# Patient Record
Sex: MALE | Race: WHITE | Employment: FULL TIME | URBAN - METROPOLITAN AREA
[De-identification: names, ages, dates, MRNs, and addresses within clinical notes are randomized per-mention and may not be internally consistent; named-entity substitution may affect disease eponyms.]

---

## 2017-11-06 ENCOUNTER — TELEPHONE (OUTPATIENT)
Dept: FAMILY MEDICINE CLINIC | Facility: CLINIC | Age: 47
End: 2017-11-06

## 2017-11-06 DIAGNOSIS — E55.9 VITAMIN D DEFICIENCY: ICD-10-CM

## 2017-11-06 DIAGNOSIS — Z00.00 LABORATORY EXAMINATION ORDERED AS PART OF A ROUTINE GENERAL MEDICAL EXAMINATION: ICD-10-CM

## 2017-11-06 DIAGNOSIS — E53.8 VITAMIN B12 DEFICIENCY: Primary | ICD-10-CM

## 2017-11-06 DIAGNOSIS — E04.1 THYROID NODULE: ICD-10-CM

## 2017-11-06 DIAGNOSIS — Z13.220 LIPID SCREENING: ICD-10-CM

## 2017-11-06 NOTE — TELEPHONE ENCOUNTER
Requesting Labs prior to appt   LOV: 12/12/16  RTC: 3 mo   Last Relevant Labs: B12, D, Testosterone, TSH/Free T4, CBC, PSA, LIPID and CMP 12/16/16      Future Appointments  Date Time Provider Balwinder Mariscal   12/4/2017 10:00 AM Sadie Haddad MD [facility] 2

## 2017-11-06 NOTE — TELEPHONE ENCOUNTER
Time started: 1135    Time ended: 1135    Total time spent on chart: 30 seconds.     LM for patient that labs were ordered and can be completed at [facility].

## 2017-11-06 NOTE — TELEPHONE ENCOUNTER
Dr. Yudi Ferreira- Pt would like to request a lab order to [facility] as he has an appt on Saturday with [facility].  Pt would like a call when order is placed.

## 2017-11-13 ENCOUNTER — TELEPHONE (OUTPATIENT)
Dept: FAMILY MEDICINE CLINIC | Facility: CLINIC | Age: 47
End: 2017-11-13

## 2017-11-13 DIAGNOSIS — Z12.5 PROSTATE CANCER SCREENING: Primary | ICD-10-CM

## 2017-11-13 NOTE — TELEPHONE ENCOUNTER
Time started: 0911    Time ended: 2804    Total time spent on chart: 7 min. I called patient to let him know he can go back and get [facility] at [facility].  He was told by lab that they will run the lab if we send the order to them.   I called [facility] to add l

## 2017-11-13 NOTE — TELEPHONE ENCOUNTER
Dr. Indira Bedolla- Patient stated that an order for PSA was left off of his labs. He would like this sent to the [address] [place] [address] lab. Please advise.

## 2017-12-04 ENCOUNTER — OFFICE VISIT (OUTPATIENT)
Dept: FAMILY MEDICINE CLINIC | Facility: CLINIC | Age: 47
End: 2017-12-04

## 2017-12-04 VITALS
WEIGHT: 204 LBS | DIASTOLIC BLOOD PRESSURE: 78 MMHG | HEART RATE: 64 BPM | BODY MASS INDEX: 27.04 KG/M2 | HEIGHT: 73 IN | RESPIRATION RATE: 16 BRPM | TEMPERATURE: 98 F | SYSTOLIC BLOOD PRESSURE: 134 MMHG

## 2017-12-04 DIAGNOSIS — Z00.00 WELLNESS EXAMINATION: Primary | ICD-10-CM

## 2017-12-04 PROCEDURE — 99396 PREV VISIT EST AGE 40-64: CPT | Performed by: FAMILY MEDICINE

## 2017-12-04 PROCEDURE — 90471 IMMUNIZATION ADMIN: CPT | Performed by: FAMILY MEDICINE

## 2017-12-04 PROCEDURE — 90686 IIV4 VACC NO PRSV 0.5 ML IM: CPT | Performed by: FAMILY MEDICINE

## 2017-12-04 NOTE — H&P
Wellness Exam    REASON FOR VISIT:    Neftali Naik is a 49year old male who presents for an [address]     Current Complaints: none  Flu Shot: see immunization record  Health Maintenance Topics with due status: Overdue       Topic Date D Body mass index is 26.91 kg/m².       Preventive Services for Which Recommendations Vary with Risk Recommendation Internal Lab or Procedure External Lab or Procedure   Cholesterol Screening Recommended screening varies with age, risk and gender LDL-CHOLESTE Smokeless tobacco: Never Used                      Alcohol use: Yes                  REVIEW OF SYSTEMS:   Constitutional: Negative for fever, chills and fatigue. HENT: Negative for hearing loss, congestion, sore throat and neck pain. He has normal reflexes. Skin: Skin is warm. No concerning rash noted. No erythema. with normal hair  Psychiatric: He has a normal mood and affect.  His behavior is normal.     ASSESSMENT AND OTHER RELEVANT CHRONIC CONDITIONS:   Steven Fagan is a 50 ye older: one dose   Varicella 2 doses if not immune   MMR 1-2 doses if born after 1957 and not immune     Patient Active Problem List:     Routine general medical examination at a health care facility     B12 deficiency     Dysplastic nevi     Thyroid nodule

## 2017-12-04 NOTE — PATIENT INSTRUCTIONS
Thank you for choosing Dawn Carpio MD at [place] [address]  To Do: [address]  1. Please see age appropriate health prevention below  Effective 6/19/17 until November 2017  Due to [employer] is being moved.   It is inside the [facility] • Please call our office about any questions regarding your treatment/medicines/tests as a result of today's visit.  For your safety, read the entire package insert of all medicines prescribed to you and be aware of all of the risks of treatment even beyon Screening tests and vaccines are an important part of managing your health. Health counseling is essential, too. Below are guidelines for these, for men ages 36 to 52. Talk with your healthcare provider to make sure you’re up to date on what you need.   Scr Hepatitis A Men at increased risk for infection – talk with your healthcare provider 2 doses given at least 6 months apart   Hepatitis B Men at increased risk for infection – talk with your healthcare provider 3 doses over 6 months; second dose should be g © 7842-3452 [address]. [address] [facility], [facility] [place]. All rights reserved. This information is not intended as a substitute for professional medical care. Always follow your healthcare professional's instructions.

## 2018-06-07 ENCOUNTER — HOSPITAL ENCOUNTER (EMERGENCY)
Age: 48
Discharge: HOME | End: 2018-06-07
Attending: EMERGENCY MEDICINE
Payer: COMMERCIAL

## 2018-06-07 VITALS
HEART RATE: 62 BPM | SYSTOLIC BLOOD PRESSURE: 130 MMHG | TEMPERATURE: 99 F | OXYGEN SATURATION: 98 % | DIASTOLIC BLOOD PRESSURE: 79 MMHG | HEIGHT: 72 IN | WEIGHT: 187 LBS | BODY MASS INDEX: 25.33 KG/M2 | RESPIRATION RATE: 18 BRPM

## 2018-06-07 DIAGNOSIS — R31.0 GROSS HEMATURIA: Primary | ICD-10-CM

## 2018-06-07 DIAGNOSIS — R30.0 DYSURIA: ICD-10-CM

## 2018-06-07 PROCEDURE — 81015 MICROSCOPIC EXAM OF URINE: CPT | Performed by: EMERGENCY MEDICINE

## 2018-06-07 PROCEDURE — 99283 EMERGENCY DEPT VISIT LOW MDM: CPT

## 2018-06-07 PROCEDURE — 81001 URINALYSIS AUTO W/SCOPE: CPT | Performed by: EMERGENCY MEDICINE

## 2018-06-07 RX ORDER — SULFAMETHOXAZOLE AND TRIMETHOPRIM 800; 160 MG/1; MG/1
1 TABLET ORAL 2 TIMES DAILY
Qty: 14 TABLET | Refills: 0 | Status: SHIPPED | OUTPATIENT
Start: 2018-06-07 | End: 2018-06-14

## 2018-06-07 NOTE — ED INITIAL ASSESSMENT (HPI)
PT STS PAIN ACROSS LOW ABD SINCE 2300 LAST NOC. STS HAD 1 EPISODE OF HEMATURIA 45 MIN PRIOR TO ARRIVAL. DENIES N/V/D.

## 2018-06-07 NOTE — ED PROVIDER NOTES
Patient Seen in: [facility] Emergency Department In [place]    History   Patient presents with:  Hematuria    Stated Complaint: hematuria    HPI    Patient is a 41-year-old male who presents with hematuria. Crampy suprapubic pain as well.   Has had simila rebound  Skin: No rashes, pallor  Neuro: No focal deficits.   Extremities: No clubbing/cyanosis/edema    ED Course     Labs Reviewed   URINALYSIS WITH CULTURE REFLEX   URINE MICROSCOPIC W REFLEX CULTURE       ED Course as of Jun 07 0623  -------------------

## 2018-06-08 ENCOUNTER — TELEPHONE (OUTPATIENT)
Dept: FAMILY MEDICINE CLINIC | Facility: CLINIC | Age: 48
End: 2018-06-08

## 2018-06-18 ENCOUNTER — TELEPHONE (OUTPATIENT)
Dept: FAMILY MEDICINE CLINIC | Facility: CLINIC | Age: 48
End: 2018-06-18

## 2018-06-18 NOTE — TELEPHONE ENCOUNTER
Patient called - was in the ER 1 week ago and they ran tests and he received a notification on my chart but cannot view any results.  Please call him with results

## 2018-06-18 NOTE — TELEPHONE ENCOUNTER
Patient  Informed that we cannot release his results from the ER - they would have to do so. I could schedule him with our provider to f/u if he wishes. Patient wanted to reach the ER. I gave him the number to contact them, he saw Dr. Mirta Nathan there.   He

## 2018-07-20 PROBLEM — R31.0 GROSS HEMATURIA: Status: ACTIVE | Noted: 2018-07-20

## 2018-07-22 PROBLEM — Z80.42 FAMILY HISTORY OF PROSTATE CANCER: Status: ACTIVE | Noted: 2018-07-22

## 2018-08-06 PROBLEM — N20.1 LEFT URETERAL CALCULUS: Status: ACTIVE | Noted: 2018-08-06

## 2018-10-01 ENCOUNTER — TELEPHONE (OUTPATIENT)
Dept: FAMILY MEDICINE CLINIC | Facility: CLINIC | Age: 48
End: 2018-10-01

## 2018-10-01 DIAGNOSIS — E04.1 THYROID NODULE: ICD-10-CM

## 2018-10-01 DIAGNOSIS — Z80.42 FAMILY HISTORY OF PROSTATE CANCER: Primary | ICD-10-CM

## 2018-10-01 DIAGNOSIS — E55.9 VITAMIN D DEFICIENCY: ICD-10-CM

## 2018-10-01 DIAGNOSIS — E53.8 VITAMIN B12 DEFICIENCY: ICD-10-CM

## 2018-10-01 DIAGNOSIS — Z00.00 LABORATORY EXAM ORDERED AS PART OF ROUTINE GENERAL MEDICAL EXAMINATION: ICD-10-CM

## 2018-10-01 NOTE — TELEPHONE ENCOUNTER
Patient states he has an appt at [address] [place] [address] this Friday for labs, would like the same orders as last year, has a physical scheduled for the end of the month.

## 2018-10-02 NOTE — TELEPHONE ENCOUNTER
Requesting Labs prior to appt   LOV: 12/4/17  RTC: 1 year  Last Relevant Labs: 11/2017      Future Appointments   Date Time Provider Balwinder Loida   10/25/2018  8:00 AM Gio Clay MD [facility] [address] [facility] [address]     Dx: B12 deficiency, thyroid nodule, Vit

## 2018-10-25 ENCOUNTER — OFFICE VISIT (OUTPATIENT)
Dept: FAMILY MEDICINE CLINIC | Facility: CLINIC | Age: 48
End: 2018-10-25
Payer: COMMERCIAL

## 2018-10-25 VITALS
SYSTOLIC BLOOD PRESSURE: 120 MMHG | DIASTOLIC BLOOD PRESSURE: 80 MMHG | BODY MASS INDEX: 25.18 KG/M2 | HEIGHT: 73 IN | HEART RATE: 68 BPM | RESPIRATION RATE: 16 BRPM | WEIGHT: 190 LBS | TEMPERATURE: 98 F

## 2018-10-25 DIAGNOSIS — Z00.00 ROUTINE GENERAL MEDICAL EXAMINATION AT A HEALTH CARE FACILITY: Primary | ICD-10-CM

## 2018-10-25 DIAGNOSIS — Z23 NEED FOR INFLUENZA VACCINATION: ICD-10-CM

## 2018-10-25 PROBLEM — L11.1 GROVER'S DISEASE: Status: ACTIVE | Noted: 2018-10-25

## 2018-10-25 PROCEDURE — 99396 PREV VISIT EST AGE 40-64: CPT | Performed by: FAMILY MEDICINE

## 2018-10-25 RX ORDER — TRIAMCINOLONE ACETONIDE 0.25 MG/G
CREAM TOPICAL
COMMUNITY
Start: 2018-10-24 | End: 2019-12-04

## 2018-10-25 NOTE — PROGRESS NOTES
Wellness Exam    REASON FOR VISIT:    Floresita Fernando is a 51year old male who presents for an [address].     Current Complaints: Mr. Pollard Has is a pleasant 26-year-old male here for his wellness exam  Flu Shot: see immunization record  Heal SCHEDULE Recommendation Internal Lab or Procedure External Lab or Procedure   Colonoscopy Screen Every 10 years There are no preventive care reminders to display for this patient.     Flex Sigmoidoscopy Screen  Every 5 years No results found for this or any intermedulary nail-  rt tibia    • REPAIR ROTATOR CUFF,ACUTE  2007   • SKIN SURGERY      basal cell ca removed 2015      Family History   Problem Relation Age of Onset   • Cancer Father         prostate age 77    • Hypertension Father    • Diabetes Materna Cardiovascular: Normal rate, regular rhythm and normal heart sounds. Exam reveals no friction rub. No murmur heard. Pulmonary/Chest: Effort normal and breath sounds normal. He has no wheezes. He has no rales. Abdominal: Soft.  Bowel sounds are norm Patient/Caregiver Education: There are no barriers to learning. Medical education done. Outcome: Patient verbalizes understanding. Educated by: MD   The patient indicates understanding of these issues and agrees to the plan.     SUGGESTED VACCINATIONS -

## 2018-10-25 NOTE — PATIENT INSTRUCTIONS
Thank you for choosing Delores Calix MD at [place] [address]  To Do: Gio Ervin  1. Please see age appropriate health prevention below    [facility] is located in [address]. Monday, Tuesday & Friday – 8 a.m. to 4 p.m.   Wednesday, PETÄJÄVESI the benefits outweigh those potential risks and we strive to make you healthier and to improve your quality of life.     Referrals, and Radiology Information:    If your insurance requires a referral to a specialist, please allow 5 business days to process exams   Blood pressure All men in this age group Yearly checkup if your blood pressure reading is normal  Normal blood pressure is less than 120/80 mm Hg  If your blood pressure is higher than normal, follow the advice of your healthcare provider      Depr dose should be given at least 2 months after the second dose and at least 4 months after the first dose   Haemophilus influenzae Type B (HIB) Men at increased risk for infection – talk with your healthcare provider 1 to 3 doses   Influenza (flu) All men in

## 2018-10-31 ENCOUNTER — TELEPHONE (OUTPATIENT)
Dept: FAMILY MEDICINE CLINIC | Facility: CLINIC | Age: 48
End: 2018-10-31

## 2018-10-31 NOTE — TELEPHONE ENCOUNTER
Form needs info from patient - called hp# vc mail not set up. Sent a message through my chart to contact the office.  The form is in [place] in progress\" bin

## 2018-10-31 NOTE — TELEPHONE ENCOUNTER
Patient dropped off form to be completed for work. Please fax to [phone] when complete and mail a copy to patient's home. Form placed in Richa's folder.

## 2019-12-04 ENCOUNTER — OFFICE VISIT (OUTPATIENT)
Dept: FAMILY MEDICINE CLINIC | Facility: CLINIC | Age: 49
End: 2019-12-04
Payer: COMMERCIAL

## 2019-12-04 VITALS
TEMPERATURE: 98 F | RESPIRATION RATE: 16 BRPM | WEIGHT: 196 LBS | HEART RATE: 66 BPM | OXYGEN SATURATION: 98 % | BODY MASS INDEX: 25.98 KG/M2 | DIASTOLIC BLOOD PRESSURE: 80 MMHG | SYSTOLIC BLOOD PRESSURE: 122 MMHG | HEIGHT: 73 IN

## 2019-12-04 DIAGNOSIS — R39.89 SENSATION OF PRESSURE IN BLADDER AREA: ICD-10-CM

## 2019-12-04 DIAGNOSIS — E53.8 B12 DEFICIENCY: ICD-10-CM

## 2019-12-04 DIAGNOSIS — Z80.42 FAMILY HISTORY OF PROSTATE CANCER: ICD-10-CM

## 2019-12-04 DIAGNOSIS — R35.0 FREQUENT URINATION: ICD-10-CM

## 2019-12-04 DIAGNOSIS — N20.1 LEFT URETERAL CALCULUS: ICD-10-CM

## 2019-12-04 DIAGNOSIS — Z12.11 COLON CANCER SCREENING: ICD-10-CM

## 2019-12-04 DIAGNOSIS — Z00.00 ROUTINE GENERAL MEDICAL EXAMINATION AT A HEALTH CARE FACILITY: Primary | ICD-10-CM

## 2019-12-04 PROBLEM — R31.0 GROSS HEMATURIA: Status: RESOLVED | Noted: 2018-07-20 | Resolved: 2019-12-04

## 2019-12-04 PROCEDURE — 99212 OFFICE O/P EST SF 10 MIN: CPT | Performed by: FAMILY MEDICINE

## 2019-12-04 PROCEDURE — 99396 PREV VISIT EST AGE 40-64: CPT | Performed by: FAMILY MEDICINE

## 2019-12-04 RX ORDER — TAMSULOSIN HYDROCHLORIDE 0.4 MG/1
0.4 CAPSULE ORAL DAILY
Qty: 90 CAPSULE | Refills: 3 | Status: SHIPPED | OUTPATIENT
Start: 2019-12-04 | End: 2020-01-03

## 2019-12-04 NOTE — PROGRESS NOTES
Steven Fagan is a 47year old male who presents for a complete physical exam.   HPI:   Urination changes: complains of recent frequent urination.   ED symptoms no  Immunizations needed: requesting flu vaccine    The patient complains of bladder cramping mg/dL    HDL CHOLESTEROL 62 >40 mg/dL    TRIGLYCERIDES 86 <150 mg/dL    LDL-CHOLESTEROL 125 (H) mg/dL (calc)    CHOL/HDLC RATIO 3.3 <5.0 (calc)    NON-HDL CHOLESTEROL 144 (H) <130 mg/dL (calc)   CBC WITH DIFFERENTIAL WITH PLATELET   Result Value Ref Range Hypertension Father    • Diabetes Maternal Grandfather    • Heart Disorder Paternal Grandmother    • Cancer Paternal Grandfather         lung    • Other (prostate cancer) Other         uncle      Social History:  Social History    Tobacco Use      Smoking adenopathy/thyromegaly/thyroid nodules/masses  CHEST: no chest tenderness  BREAST: no suspicious masses appreciated on palpation  LUNGS: CTA A/P, no wheezes/ronchi/rales/crackles, normal air excursion  CARDIOVASCULAR: RRR, no murmur, no lower extremity lily METABOLIC PANEL (14)  -     LIPID PANEL  -     CT CALCIUM SCORING; Future  Patient will undergo complete physical labs on a day when he is fasting. Recommend healthy diet including green leafy vegetables, fresh fruits and lean meats.     Aerobic exercise 12/4/2019, 11:26 AM.      I, Solis Nicole MD,  personally performed the services described in this documentation. All medical record entries made by the scribe were at my direction and in my presence.   I have reviewed the chart and discharge instruction

## 2020-01-31 ENCOUNTER — OFFICE VISIT (OUTPATIENT)
Dept: FAMILY MEDICINE CLINIC | Facility: CLINIC | Age: 50
End: 2020-01-31
Payer: COMMERCIAL

## 2020-01-31 VITALS
DIASTOLIC BLOOD PRESSURE: 80 MMHG | HEIGHT: 72 IN | SYSTOLIC BLOOD PRESSURE: 118 MMHG | BODY MASS INDEX: 26.8 KG/M2 | WEIGHT: 197.88 LBS | RESPIRATION RATE: 16 BRPM | TEMPERATURE: 98 F | OXYGEN SATURATION: 98 % | HEART RATE: 76 BPM

## 2020-01-31 DIAGNOSIS — K12.2 UVULITIS: Primary | ICD-10-CM

## 2020-01-31 LAB
CONTROL LINE PRESENT WITH A CLEAR BACKGROUND (YES/NO): YES YES/NO
KIT LOT #: NORMAL NUMERIC

## 2020-01-31 PROCEDURE — 99213 OFFICE O/P EST LOW 20 MIN: CPT | Performed by: PHYSICIAN ASSISTANT

## 2020-01-31 PROCEDURE — 87880 STREP A ASSAY W/OPTIC: CPT | Performed by: PHYSICIAN ASSISTANT

## 2020-01-31 RX ORDER — AMOXICILLIN 500 MG/1
500 CAPSULE ORAL 3 TIMES DAILY
Qty: 30 CAPSULE | Refills: 0 | Status: SHIPPED | OUTPATIENT
Start: 2020-01-31 | End: 2020-02-10

## 2020-01-31 RX ORDER — METHYLPREDNISOLONE 4 MG/1
TABLET ORAL
Qty: 1 PACKAGE | Refills: 0 | Status: SHIPPED | OUTPATIENT
Start: 2020-01-31 | End: 2021-02-10 | Stop reason: ALTCHOICE

## 2020-01-31 NOTE — PROGRESS NOTES
CHIEF COMPLAINT:   Patient presents with:  Sore Throat: sore throat, x last night         HPI:   Holger Adler is a 45year old male presents to clinic with complaint of sore throat. The patient has had symptoms for one day.    Patient reports following GENERAL: well developed, well nourished,in no apparent distress  SKIN: no rashes,no suspicious lesions  HEAD: atraumatic, normocephalic  EYES: conjunctiva clear, EOM intact  EARS: Left TM normal, no erythema, no bulging, retraction, or fluid.    Right TM no Symptoms of uvulitis include:  · Sore throat  · Trouble swallowing  · Painful swallowing  · Trouble breathing  Possible causes of uvulitis include:  · Throat infection  · Inhaling or swallowing chemicals   · Inhaling hot air or steam  · Allergic reaction t · Drink fluids. Pain when swallowing may make it harder to drink and lead to dehydration. To prevent this, sip fluids throughout the day. Children can be given frozen juice bars, milk, or other cold liquids. Watch for the signs of dehydration listed below. For infants and toddlers, be sure to use a rectal thermometer correctly. A rectal thermometer may accidentally poke a hole in (perforate) the rectum. It may also pass on germs from the stool. Always follow the product maker’s directions for proper use.  If

## 2020-01-31 NOTE — PATIENT INSTRUCTIONS
1. Amoxicillin  2. Medrol  3. Follow up with PCP  4. If worsening symptoms seek treatment      Uvulitis    The uvula is the tissue that hangs in the back of the throat. Uvulitis is inflammation of the uvula.  Inflammation happens when the body responds to · If medicines were prescribed, be sure they are taken as directed. They should be taken until they are gone or the healthcare provider says to stop them.   · If you were told that your angioedema was from a medicine that you are taking, you must stop [place] · Skin or lips look blue, purple, or gray  Fever and children  Always use a digital thermometer to check your child’s temperature. Never use a mercury thermometer. For infants and toddlers, be sure to use a rectal thermometer correctly.  A rectal thermomet

## 2020-03-02 ENCOUNTER — OFFICE VISIT (OUTPATIENT)
Dept: FAMILY MEDICINE CLINIC | Facility: CLINIC | Age: 50
End: 2020-03-02
Payer: COMMERCIAL

## 2020-03-02 VITALS
RESPIRATION RATE: 16 BRPM | SYSTOLIC BLOOD PRESSURE: 132 MMHG | OXYGEN SATURATION: 97 % | BODY MASS INDEX: 27.22 KG/M2 | TEMPERATURE: 98 F | DIASTOLIC BLOOD PRESSURE: 90 MMHG | HEART RATE: 84 BPM | WEIGHT: 201 LBS | HEIGHT: 72 IN

## 2020-03-02 DIAGNOSIS — J01.40 ACUTE PANSINUSITIS, RECURRENCE NOT SPECIFIED: Primary | ICD-10-CM

## 2020-03-02 PROCEDURE — 99213 OFFICE O/P EST LOW 20 MIN: CPT | Performed by: PHYSICIAN ASSISTANT

## 2020-03-02 RX ORDER — DOXYCYCLINE HYCLATE 100 MG/1
100 CAPSULE ORAL 2 TIMES DAILY
Qty: 20 CAPSULE | Refills: 0 | Status: SHIPPED | OUTPATIENT
Start: 2020-03-02 | End: 2020-03-12

## 2020-03-02 NOTE — PATIENT INSTRUCTIONS
Patient Declined AVS    Verbal Instructions given      1. Doxycycline  2. OTC antihistamine  3.  Follow up with PCP

## 2021-01-18 ENCOUNTER — HOSPITAL ENCOUNTER (OUTPATIENT)
Dept: GENERAL RADIOLOGY | Age: 51
Discharge: HOME | End: 2021-01-18
Attending: ORTHOPAEDIC SURGERY
Payer: COMMERCIAL

## 2021-01-18 ENCOUNTER — OFFICE VISIT (OUTPATIENT)
Dept: ORTHOPEDICS CLINIC | Facility: CLINIC | Age: 51
End: 2021-01-18
Payer: COMMERCIAL

## 2021-01-18 VITALS — OXYGEN SATURATION: 100 % | HEART RATE: 68 BPM

## 2021-01-18 DIAGNOSIS — M25.512 ACUTE PAIN OF LEFT SHOULDER: ICD-10-CM

## 2021-01-18 DIAGNOSIS — M25.512 ACUTE PAIN OF LEFT SHOULDER: Primary | ICD-10-CM

## 2021-01-18 PROCEDURE — 99204 OFFICE O/P NEW MOD 45 MIN: CPT | Performed by: ORTHOPAEDIC SURGERY

## 2021-01-18 PROCEDURE — 73030 X-RAY EXAM OF SHOULDER: CPT | Performed by: ORTHOPAEDIC SURGERY

## 2021-01-18 NOTE — H&P
[facility] - ORTHOPEDICS  [address] [address]  [phone]     NEW PATIENT VISIT - HISTORY AND PHYSICAL EXAMINATION     Name: Liz Jerome   MRN: [id number]  Date: 1/18/2021     CC: Left should HENT: Negative for sore throat. Eyes: Negative for visual disturbance. Respiratory: Negative for shortness of breath. Cardiovascular: Negative for chest pain and leg swelling.    Gastrointestinal: Negative for abdominal pain, constipation, diarrhea, Examination of the left shoulder demonstrates:     Skin is intact, warm and dry.    Cervical:  Full ROM  Spurling's  Negative    Deformity:   none  Atrophy:   none    Scapular winging: Negative    Palpation:     AC Joint  Negative  Biceps Tendon  Negative CONCLUSION:  1. No acute fracture or dislocation. 2. The glenohumeral joint space is unremarkable. 3. There is minimal degenerative arthropathy of the left acromioclavicular joint space.     Dictated by (CST): Nathan Henson MD on 1/18/2021 at 9:13 AM

## 2021-02-02 ENCOUNTER — HOSPITAL ENCOUNTER (OUTPATIENT)
Dept: MRI IMAGING | Facility: HOSPITAL | Age: 51
Discharge: HOME | End: 2021-02-02
Attending: ORTHOPAEDIC SURGERY
Payer: COMMERCIAL

## 2021-02-02 DIAGNOSIS — M25.512 ACUTE PAIN OF LEFT SHOULDER: ICD-10-CM

## 2021-02-02 PROCEDURE — 73221 MRI JOINT UPR EXTREM W/O DYE: CPT | Performed by: ORTHOPAEDIC SURGERY

## 2021-02-10 ENCOUNTER — OFFICE VISIT (OUTPATIENT)
Dept: ORTHOPEDICS CLINIC | Facility: CLINIC | Age: 51
End: 2021-02-10
Payer: COMMERCIAL

## 2021-02-10 VITALS — BODY MASS INDEX: 27 KG/M2 | OXYGEN SATURATION: 99 % | HEIGHT: 72 IN | HEART RATE: 68 BPM | RESPIRATION RATE: 16 BRPM

## 2021-02-10 DIAGNOSIS — M75.122 NONTRAUMATIC COMPLETE TEAR OF LEFT ROTATOR CUFF: Primary | ICD-10-CM

## 2021-02-10 DIAGNOSIS — M75.22 BICEPS TENDINITIS OF LEFT UPPER EXTREMITY: ICD-10-CM

## 2021-02-10 DIAGNOSIS — M75.42 SUBACROMIAL IMPINGEMENT OF LEFT SHOULDER: ICD-10-CM

## 2021-02-10 DIAGNOSIS — M19.019 AC JOINT ARTHROPATHY: ICD-10-CM

## 2021-02-10 PROCEDURE — 99215 OFFICE O/P EST HI 40 MIN: CPT | Performed by: ORTHOPAEDIC SURGERY

## 2021-02-10 NOTE — PROGRESS NOTES
OR BOOKING SHEET SHOULDER  Name: Holger Adler  MRN: [id number]   : 1970  Diagnosis:  [x] Nontraumatic complete tear of left rotator cuff [M75.122]  Disposition:    [x] Ambulatory  [] Overnight for PAPITO  [] Overnight for observation and pain cont

## 2021-02-10 NOTE — PROGRESS NOTES
[facility]  [address] [facility] [address]       Name: Candida Scott   MRN: [id number]  Date: 2/10/2021     REASON FOR VISIT: Follow-up evaluation to discuss MRI results of left The contralateral upper extremity is without limitation in range of motion or strength, no positive provocative maneuvers.      Radiographic Examination/Diagnostics:    MRI and x-ray of left shoulder personally viewed, independently interpreted and radiolog PROCEDURE:  MRI SHOULDER, LEFT (CPT=73221)  COMPARISON:  Inga, XR, XR SHOULDER, COMPLETE (MIN 2 VIEWS), LEFT (CPT=73030), 1/18/2021, 8:52 AM.  INDICATIONS:  M25.512 Acute pain of left shoulder  TECHNIQUE:  Multiplanar imaging of the shoulder includin without cortical bone involvement. This lesion is somewhat lobulated with high T2 signal, low T1 signal and internal chondroid type matrix, measuring approximately 2.0 x 1.6 x 1.3 cm in craniocaudal, transverse and AP dimensions respectively.   The lesion I discussed with him the role of platelet rich plasma injection as an adjunct to help address the rotator cuff tear and potentially improve the likelihood of success with this.     Otherwise I believe he is a excellent candidate for arthroscopic rotator cuf In particular we discussed risks that include, but are not limited to infection, blood loss, potential transient or permanent injury to nerves or blood vessels, joint stiffness, persistent pain, need for future operation, failure of healing, wound complica

## 2021-02-12 NOTE — PROGRESS NOTES
Surgeon:Dr. Michael Pollard   Date of Surgery:2/26/21 Dr. Purvis Query to 2/25/21  Facility:[facility]        If [facility], scheduling sheet to referral team?:N/A  Clearance needed:Yes        If yes, requested?:Yes, Faxed to Dr. Gio Harvey

## 2021-02-15 NOTE — PROGRESS NOTES
Nellie Shelley's case has been scheduled/rescheduled at 1225 on 2/26/2021 at [facility]  Received:  Today  Message Contents   Brianne Lopez RMA             Patient Name: Wenceslao Garcia    MRN: [id number]     Procedure(s)

## 2021-02-24 ENCOUNTER — LAB ENCOUNTER (OUTPATIENT)
Dept: LAB | Age: 51
End: 2021-02-24
Attending: ORTHOPAEDIC SURGERY
Payer: COMMERCIAL

## 2021-02-24 DIAGNOSIS — M19.019 AC JOINT ARTHROPATHY: ICD-10-CM

## 2021-02-24 LAB — SARS-COV-2 RNA RESP QL NAA+PROBE: NOT DETECTED

## 2021-02-25 RX ORDER — TRAMADOL HYDROCHLORIDE 50 MG/1
TABLET ORAL
Qty: 20 TABLET | Refills: 1 | Status: SHIPPED | OUTPATIENT
Start: 2021-02-25 | End: 2021-04-07

## 2021-02-25 RX ORDER — ONDANSETRON 4 MG/1
4 TABLET, ORALLY DISINTEGRATING ORAL EVERY 8 HOURS PRN
Qty: 8 TABLET | Refills: 0 | Status: SHIPPED | OUTPATIENT
Start: 2021-02-25 | End: 2021-04-07

## 2021-02-26 ENCOUNTER — TELEPHONE (OUTPATIENT)
Dept: PHYSICAL THERAPY | Facility: HOSPITAL | Age: 51
End: 2021-02-26

## 2021-02-26 ENCOUNTER — HOSPITAL ENCOUNTER (OUTPATIENT)
Facility: HOSPITAL | Age: 51
Setting detail: HOSPITAL OUTPATIENT SURGERY
Discharge: HOME | End: 2021-02-26
Attending: ORTHOPAEDIC SURGERY | Admitting: ORTHOPAEDIC SURGERY
Payer: COMMERCIAL

## 2021-02-26 ENCOUNTER — ANESTHESIA (OUTPATIENT)
Dept: SURGERY | Facility: HOSPITAL | Age: 51
End: 2021-02-26
Payer: COMMERCIAL

## 2021-02-26 ENCOUNTER — ANESTHESIA EVENT (OUTPATIENT)
Dept: SURGERY | Facility: HOSPITAL | Age: 51
End: 2021-02-26
Payer: COMMERCIAL

## 2021-02-26 VITALS
HEART RATE: 70 BPM | HEIGHT: 72 IN | WEIGHT: 194.88 LBS | OXYGEN SATURATION: 97 % | BODY MASS INDEX: 26.4 KG/M2 | DIASTOLIC BLOOD PRESSURE: 79 MMHG | TEMPERATURE: 98 F | SYSTOLIC BLOOD PRESSURE: 133 MMHG | RESPIRATION RATE: 18 BRPM

## 2021-02-26 DIAGNOSIS — M75.122 NONTRAUMATIC COMPLETE TEAR OF LEFT ROTATOR CUFF: ICD-10-CM

## 2021-02-26 DIAGNOSIS — M75.42 SUBACROMIAL IMPINGEMENT OF LEFT SHOULDER: ICD-10-CM

## 2021-02-26 DIAGNOSIS — M75.22 BICEPS TENDINITIS OF LEFT UPPER EXTREMITY: ICD-10-CM

## 2021-02-26 DIAGNOSIS — M19.019 AC JOINT ARTHROPATHY: Primary | ICD-10-CM

## 2021-02-26 PROCEDURE — 0LQ24ZZ REPAIR LEFT SHOULDER TENDON, PERCUTANEOUS ENDOSCOPIC APPROACH: ICD-10-PCS | Performed by: ORTHOPAEDIC SURGERY

## 2021-02-26 PROCEDURE — 0PBB4ZZ EXCISION OF LEFT CLAVICLE, PERCUTANEOUS ENDOSCOPIC APPROACH: ICD-10-PCS | Performed by: ORTHOPAEDIC SURGERY

## 2021-02-26 PROCEDURE — 0RHK44Z INSERTION OF INTERNAL FIXATION DEVICE INTO LEFT SHOULDER JOINT, PERCUTANEOUS ENDOSCOPIC APPROACH: ICD-10-PCS | Performed by: ORTHOPAEDIC SURGERY

## 2021-02-26 PROCEDURE — 76942 ECHO GUIDE FOR BIOPSY: CPT | Performed by: ANESTHESIOLOGY

## 2021-02-26 PROCEDURE — 3E0T33Z INTRODUCTION OF ANTI-INFLAMMATORY INTO PERIPHERAL NERVES AND PLEXI, PERCUTANEOUS APPROACH: ICD-10-PCS | Performed by: ANESTHESIOLOGY

## 2021-02-26 PROCEDURE — 0RNK4ZZ RELEASE LEFT SHOULDER JOINT, PERCUTANEOUS ENDOSCOPIC APPROACH: ICD-10-PCS | Performed by: ORTHOPAEDIC SURGERY

## 2021-02-26 PROCEDURE — 3E0T3BZ INTRODUCTION OF ANESTHETIC AGENT INTO PERIPHERAL NERVES AND PLEXI, PERCUTANEOUS APPROACH: ICD-10-PCS | Performed by: ANESTHESIOLOGY

## 2021-02-26 PROCEDURE — 0MM24ZZ REATTACHMENT OF LEFT SHOULDER BURSA AND LIGAMENT, PERCUTANEOUS ENDOSCOPIC APPROACH: ICD-10-PCS | Performed by: ORTHOPAEDIC SURGERY

## 2021-02-26 DEVICE — ANCHR/SCREW SWIVELCK PEEK 4.75: Type: IMPLANTABLE DEVICE | Site: SHOULDER | Status: FUNCTIONAL

## 2021-02-26 DEVICE — ANCHOR SUTURE DBL LOAD 4.75MM: Type: IMPLANTABLE DEVICE | Site: SHOULDER | Status: FUNCTIONAL

## 2021-02-26 RX ORDER — NALOXONE HYDROCHLORIDE 0.4 MG/ML
80 INJECTION, SOLUTION INTRAMUSCULAR; INTRAVENOUS; SUBCUTANEOUS AS NEEDED
Status: ACTIVE | OUTPATIENT
Start: 2021-02-26 | End: 2021-02-26

## 2021-02-26 RX ORDER — METOCLOPRAMIDE HYDROCHLORIDE 5 MG/ML
10 INJECTION INTRAMUSCULAR; INTRAVENOUS AS NEEDED
Status: ACTIVE | OUTPATIENT
Start: 2021-02-26 | End: 2021-02-26

## 2021-02-26 RX ORDER — DEXAMETHASONE SODIUM PHOSPHATE 4 MG/ML
VIAL (ML) INJECTION AS NEEDED
Status: DISCONTINUED | OUTPATIENT
Start: 2021-02-26 | End: 2021-02-26 | Stop reason: SURG

## 2021-02-26 RX ORDER — HYDROMORPHONE HYDROCHLORIDE 1 MG/ML
0.4 INJECTION, SOLUTION INTRAMUSCULAR; INTRAVENOUS; SUBCUTANEOUS EVERY 5 MIN PRN
Status: ACTIVE | OUTPATIENT
Start: 2021-02-26 | End: 2021-02-26

## 2021-02-26 RX ORDER — ACETAMINOPHEN 500 MG
1000 TABLET ORAL ONCE
Status: DISCONTINUED | OUTPATIENT
Start: 2021-02-26 | End: 2021-02-26 | Stop reason: HOSPADM

## 2021-02-26 RX ORDER — SODIUM CHLORIDE, SODIUM LACTATE, POTASSIUM CHLORIDE, CALCIUM CHLORIDE 600; 310; 30; 20 MG/100ML; MG/100ML; MG/100ML; MG/100ML
INJECTION, SOLUTION INTRAVENOUS CONTINUOUS
Status: DISCONTINUED | OUTPATIENT
Start: 2021-02-26 | End: 2021-02-26

## 2021-02-26 RX ORDER — MIDAZOLAM HYDROCHLORIDE 1 MG/ML
INJECTION INTRAMUSCULAR; INTRAVENOUS AS NEEDED
Status: DISCONTINUED | OUTPATIENT
Start: 2021-02-26 | End: 2021-02-26 | Stop reason: SURG

## 2021-02-26 RX ORDER — ACETAMINOPHEN 500 MG
1000 TABLET ORAL EVERY 6 HOURS PRN
COMMUNITY
End: 2021-04-07

## 2021-02-26 RX ORDER — ONDANSETRON 2 MG/ML
INJECTION INTRAMUSCULAR; INTRAVENOUS AS NEEDED
Status: DISCONTINUED | OUTPATIENT
Start: 2021-02-26 | End: 2021-02-26 | Stop reason: SURG

## 2021-02-26 RX ORDER — CEFAZOLIN SODIUM/WATER 2 G/20 ML
2 SYRINGE (ML) INTRAVENOUS ONCE
Status: COMPLETED | OUTPATIENT
Start: 2021-02-26 | End: 2021-02-26

## 2021-02-26 RX ORDER — TRANEXAMIC ACID 10 MG/ML
1000 INJECTION, SOLUTION INTRAVENOUS
Status: COMPLETED | OUTPATIENT
Start: 2021-02-26 | End: 2021-02-26

## 2021-02-26 RX ORDER — HYDROCODONE BITARTRATE AND ACETAMINOPHEN 10; 325 MG/1; MG/1
2 TABLET ORAL AS NEEDED
Status: COMPLETED | OUTPATIENT
Start: 2021-02-26 | End: 2021-02-26

## 2021-02-26 RX ORDER — HYDROCODONE BITARTRATE AND ACETAMINOPHEN 10; 325 MG/1; MG/1
1 TABLET ORAL AS NEEDED
Status: COMPLETED | OUTPATIENT
Start: 2021-02-26 | End: 2021-02-26

## 2021-02-26 RX ORDER — ONDANSETRON 2 MG/ML
4 INJECTION INTRAMUSCULAR; INTRAVENOUS AS NEEDED
Status: ACTIVE | OUTPATIENT
Start: 2021-02-26 | End: 2021-02-26

## 2021-02-26 RX ORDER — LIDOCAINE HYDROCHLORIDE 10 MG/ML
INJECTION, SOLUTION EPIDURAL; INFILTRATION; INTRACAUDAL; PERINEURAL AS NEEDED
Status: DISCONTINUED | OUTPATIENT
Start: 2021-02-26 | End: 2021-02-26 | Stop reason: SURG

## 2021-02-26 RX ORDER — PHENYLEPHRINE HCL 10 MG/ML
VIAL (ML) INJECTION AS NEEDED
Status: DISCONTINUED | OUTPATIENT
Start: 2021-02-26 | End: 2021-02-26 | Stop reason: SURG

## 2021-02-26 RX ORDER — TOBRAMYCIN 3 MG/ML
1 SOLUTION/ DROPS OPHTHALMIC
Status: DISCONTINUED | OUTPATIENT
Start: 2021-02-26 | End: 2021-02-26

## 2021-02-26 RX ADMIN — ONDANSETRON 4 MG: 2 INJECTION INTRAMUSCULAR; INTRAVENOUS at 10:11:00

## 2021-02-26 RX ADMIN — PHENYLEPHRINE HCL 100 MCG: 10 MG/ML VIAL (ML) INJECTION at 10:35:00

## 2021-02-26 RX ADMIN — PHENYLEPHRINE HCL 100 MCG: 10 MG/ML VIAL (ML) INJECTION at 09:58:00

## 2021-02-26 RX ADMIN — SODIUM CHLORIDE, SODIUM LACTATE, POTASSIUM CHLORIDE, CALCIUM CHLORIDE: 600; 310; 30; 20 INJECTION, SOLUTION INTRAVENOUS at 11:07:00

## 2021-02-26 RX ADMIN — MIDAZOLAM HYDROCHLORIDE 6 MG: 1 INJECTION INTRAMUSCULAR; INTRAVENOUS at 08:54:00

## 2021-02-26 RX ADMIN — LIDOCAINE HYDROCHLORIDE 50 MG: 10 INJECTION, SOLUTION EPIDURAL; INFILTRATION; INTRACAUDAL; PERINEURAL at 09:02:00

## 2021-02-26 RX ADMIN — DEXAMETHASONE SODIUM PHOSPHATE 8 MG: 4 MG/ML VIAL (ML) INJECTION at 09:59:00

## 2021-02-26 RX ADMIN — TRANEXAMIC ACID 1000 MG: 10 INJECTION, SOLUTION INTRAVENOUS at 09:28:00

## 2021-02-26 RX ADMIN — CEFAZOLIN SODIUM/WATER 2 G: 2 G/20 ML SYRINGE (ML) INTRAVENOUS at 08:56:00

## 2021-02-26 RX ADMIN — PHENYLEPHRINE HCL 100 MCG: 10 MG/ML VIAL (ML) INJECTION at 10:51:00

## 2021-02-26 NOTE — ANESTHESIA POSTPROCEDURE EVALUATION
[address] 63  Patient Status:  Hospital Outpatient Surgery   Age/Gender 45year old male MRN [id number]   [facility] SURGERY Attending Khushbu Golden MD   Hosp Day # 0 PCP Bayard Fridge, MD Hiram Gitelman

## 2021-02-26 NOTE — ANESTHESIA PREPROCEDURE EVALUATION
PRE-OP EVALUATION    Patient Name: Jose France.    Pre-op Diagnosis: Nontraumatic complete tear of left rotator cuff [M75.122]  Subacromial impingement of left shoulder [M75.42]  AC joint arthropathy [M19.019]  Biceps tendinitis of left upper cm  Neck ROM: full Cardiovascular    Cardiovascular exam normal.  Rhythm: regular  Rate: normal     Dental    No notable dental history. Pulmonary    Pulmonary exam normal.  Breath sounds clear to auscultation bilaterally.                Other findi

## 2021-02-26 NOTE — OPERATIVE REPORT
[facility] OPERATIVE RECORD  ATTENDING SURGEON: Alex Sheriff MD  ASSISTANT(S): Erica Nieto [facility] [place], [place] [facility]  PRELIMINARY DIAGNOSIS:  1.  Left shoulder full thickness rotator cuff tear   2. Left shoulder Biceps Tendinitis  3 Mary Mcnamara is a 45year old male with history, physical examination, and imaging findings consistent with the aforementioned diagnosis.     Operative and nonoperative options were discussed with him in my office and we ultimately agreed that surgery would provi A 15 blade was used to make the skin incision standard posterior portal, the 4 mm arthroscope was introduced into the glenohumeral joint.  A standard anterior portal was established within the rotator internal and a probe was introduced into the glenohumera With the arthroscope in the posterior viewing portal within the subacromial space, coablation was introduced from the anterolateral portal. The CA ligament was noted to be frayed on the undersurface and was carefully debrided identifying a type II acromion 2. DVT Prophylaxis: Not indicated   3. Follow-up Visit: 1 week      Saima Horan MD  Knee, Shoulder, & Elbow Surgery / Sports Medicine Specialist  [facility]  [facility] [address], [address] [facility]. [email or web address]

## 2021-02-26 NOTE — H&P
Unchanged from previous evaluation.        [place] [address]  [phone]      NEW PATIENT VISIT - HISTORY AND PHYSICAL EXAMINATION      Name: Hari Machado   MRN: [id number]  Date: 1/18/2021      CC: Left shoulder pain     REFERRED BY: Ayo Evans, Constitutional: Negative for activity change, fatigue and fever. HENT: Negative for sore throat. Eyes: Negative for visual disturbance. Respiratory: Negative for shortness of breath. Cardiovascular: Negative for chest pain and leg swelling.    Gas Mental Status: Alert. Psychiatric:         Mood and Affect: Mood normal.      Examination of the left shoulder demonstrates:      Skin is intact, warm and dry.    Cervical:  Full ROM  Spurling's                           Negative     Deformity: PROCEDURE:  XR SHOULDER, COMPLETE (MIN 2 VIEWS), LEFT (CPT=73030)     TECHNIQUE:  Multiple views were obtained. COMPARISON:  None.   INDICATIONS:  M25.512 Acute pain of left shoulder  PATIENT STATED HISTORY: (As transcribed by Technologist)  Patient is her

## 2021-02-26 NOTE — ANESTHESIA PROCEDURE NOTES
[facility]  Performed by: Doroteo Crigler, MD  Authorized by: Doroteo Crigler, MD       General Information and Staff    Start Time:  2/26/2021 8:58 AM  End Time:  2/26/2021 9:00 AM  Anesthesiologist:  Doroteo Crigler, MD  Performed by:   Anesthes

## 2021-02-26 NOTE — ANESTHESIA PROCEDURE NOTES
Airway  Date/Time: 2/26/2021 9:03 AM  Urgency: elective    Airway not difficult    General Information and Staff    Patient location during procedure: OR  Anesthesiologist: Kimber Don MD  Performed: anesthesiologist     Indications and Patient Cond

## 2021-02-26 NOTE — BRIEF OP NOTE
Pre-Operative Diagnosis: Nontraumatic complete tear of left rotator cuff [M75.122]  Subacromial impingement of left shoulder [M75.42]  AC joint arthropathy [M19.019]  Biceps tendinitis of left upper extremity [M75.22]     Post-Operative Diagnosis: Nontraum

## 2021-03-01 ENCOUNTER — OFFICE VISIT (OUTPATIENT)
Dept: PHYSICAL THERAPY | Age: 51
End: 2021-03-01
Attending: ORTHOPAEDIC SURGERY
Payer: COMMERCIAL

## 2021-03-01 DIAGNOSIS — M19.019 AC JOINT ARTHROPATHY: ICD-10-CM

## 2021-03-01 PROCEDURE — 97110 THERAPEUTIC EXERCISES: CPT | Performed by: PHYSICAL THERAPIST

## 2021-03-01 PROCEDURE — 97161 PT EVAL LOW COMPLEX 20 MIN: CPT | Performed by: PHYSICAL THERAPIST

## 2021-03-01 NOTE — PROGRESS NOTES
POST-OP SHOULDER EVALUATION:   Referring Physician: Dr. Justo Damian  Diagnosis: AC jt arthropathy, full thickness supraspinatus tear, biceps tenodesis, Distal clavicle resection L shoulder on 2/26/2021     Date of Service: 3/1/2021     PATIENT SUMMARY   Jaxson Lr strengthening for 6-8 ws, wks 3-7 - gradual progression to AROM, avoid holding items greater than 2#, active ROM with neutral wrist, wand ex, deltoid isometrics.   OBJECTIVE:   Observation/Posture: Dressing present over incision sites    Sensation: normal to don deodorant, don/doff shirts, and wash hair  · Pt will increase shoulder AROM ER to 80 to reach and fasten seatbelt   · Pt will increase shoulder AROM IR to 75 to be able to reach in back pocket, tuck in shirt, and turn steering wheel without pain  ·

## 2021-03-03 ENCOUNTER — OFFICE VISIT (OUTPATIENT)
Dept: PHYSICAL THERAPY | Age: 51
End: 2021-03-03
Attending: ORTHOPAEDIC SURGERY
Payer: COMMERCIAL

## 2021-03-03 ENCOUNTER — APPOINTMENT (OUTPATIENT)
Dept: PHYSICAL THERAPY | Age: 51
End: 2021-03-03
Attending: ORTHOPAEDIC SURGERY
Payer: COMMERCIAL

## 2021-03-03 PROCEDURE — 97014 ELECTRIC STIMULATION THERAPY: CPT | Performed by: PHYSICAL THERAPIST

## 2021-03-03 PROCEDURE — 97110 THERAPEUTIC EXERCISES: CPT | Performed by: PHYSICAL THERAPIST

## 2021-03-03 NOTE — PROGRESS NOTES
Dx: LOREN jt arthropathy, full thickness supraspinatus tear, biceps tenodesis, Distal clavicle resection L shoulder on 2/26/2021          Authorized # of Visits:  n/a         Next MD visit: 3/4/2021 scheduled  Fall Risk: Standard         Precautions: n/a protocol.   Date: 3/3/2021  TX#: 2/18 Date:  TX#: 3/   Date:  TX#: 4/ Date:  TX#: 5/ Date:  TX#: 6/ Date:  TX#: 7/ Date:  TX#: 8/   THERE EX 35  MINS         Self stretch PROM on table sh flex 10 sec X 10         Self stretch PROM on table sh abd 10 sec X 1

## 2021-03-04 ENCOUNTER — OFFICE VISIT (OUTPATIENT)
Dept: ORTHOPEDICS CLINIC | Facility: CLINIC | Age: 51
End: 2021-03-04
Payer: COMMERCIAL

## 2021-03-04 VITALS — HEART RATE: 75 BPM | OXYGEN SATURATION: 99 %

## 2021-03-04 DIAGNOSIS — Z98.890 S/P ARTHROSCOPY OF SHOULDER: Primary | ICD-10-CM

## 2021-03-04 PROCEDURE — 99024 POSTOP FOLLOW-UP VISIT: CPT | Performed by: ORTHOPAEDIC SURGERY

## 2021-03-04 RX ORDER — METHOCARBAMOL 750 MG/1
750 TABLET, FILM COATED ORAL NIGHTLY PRN
Qty: 30 TABLET | Refills: 1 | Status: SHIPPED | OUTPATIENT
Start: 2021-03-04 | End: 2021-04-07

## 2021-03-08 ENCOUNTER — OFFICE VISIT (OUTPATIENT)
Dept: PHYSICAL THERAPY | Age: 51
End: 2021-03-08
Attending: ORTHOPAEDIC SURGERY
Payer: COMMERCIAL

## 2021-03-08 PROCEDURE — 97110 THERAPEUTIC EXERCISES: CPT | Performed by: PHYSICAL THERAPIST

## 2021-03-08 NOTE — PROGRESS NOTES
Dx: LOREN jt arthropathy, full thickness supraspinatus tear, biceps tenodesis, Distal clavicle resection L shoulder on 2/26/2021          Authorized # of Visits:  n/a         Next MD visit: 3/4/2021 scheduled  Fall Risk: Standard         Precautions: n/a 3/3/2021  TX#: 2/18 Date: 3/8/2021  TX#: 3/18   Date:  TX#: 4/ Date:  TX#: 5/ Date:  TX#: 6/ Date:  TX#: 7/ Date:  TX#: 8/   THERE EX 35  MINS THERE EX 45  MINS        Self stretch PROM on table sh flex 10 sec X 10 Self stretch PROM on table sh flex 10 sec

## 2021-03-08 NOTE — PROGRESS NOTES
[facility]  [address] [facility] [address] [facility] [address]     Name: Leonarda Herbert   MRN: [id number]  Date: 3/4/2021     REASON FOR VISIT: First Post-Surgical Visit  Date of Surgery: 2/26/2021–l touch and full strength. IMPRESSION: Kathryn Sauer. is a 45year old male POD #6 s/p left shoulder arthroscopic rotator cuff repair, doing well without concerns. PLAN:   Continue with rehabilitative efforts.  Maintain NWB in sling for a

## 2021-03-10 ENCOUNTER — APPOINTMENT (OUTPATIENT)
Dept: PHYSICAL THERAPY | Age: 51
End: 2021-03-10
Attending: ORTHOPAEDIC SURGERY
Payer: COMMERCIAL

## 2021-03-11 ENCOUNTER — OFFICE VISIT (OUTPATIENT)
Dept: PHYSICAL THERAPY | Age: 51
End: 2021-03-11
Attending: ORTHOPAEDIC SURGERY
Payer: COMMERCIAL

## 2021-03-11 PROCEDURE — 97110 THERAPEUTIC EXERCISES: CPT | Performed by: PHYSICAL THERAPIST

## 2021-03-11 NOTE — PROGRESS NOTES
Dx: LOREN jt arthropathy, full thickness supraspinatus tear, biceps tenodesis, Distal clavicle resection L shoulder on 2/26/2021          Authorized # of Visits:  n/a         Next MD visit: 4/5/21 scheduled  Fall Risk: Standard         Precautions: n/a passive ROM per protocol.   Date: 3/3/2021  TX#: 2/18 Date: 3/8/2021  TX#: 3/18   Date: 3/11/2021  TX#: 4/18 Date:  TX#: 5/ Date:  TX#: 6/ Date:  TX#: 7/ Date:  TX#: 8/   THERE EX 35  MINS THERE EX 45  MINS THERE EX 45  MINS       Self stretch PROM on table

## 2021-03-12 DIAGNOSIS — Z23 NEED FOR VACCINATION: ICD-10-CM

## 2021-03-15 ENCOUNTER — OFFICE VISIT (OUTPATIENT)
Dept: PHYSICAL THERAPY | Age: 51
End: 2021-03-15
Attending: ORTHOPAEDIC SURGERY
Payer: COMMERCIAL

## 2021-03-15 PROCEDURE — 97110 THERAPEUTIC EXERCISES: CPT | Performed by: PHYSICAL THERAPIST

## 2021-03-15 PROCEDURE — 97140 MANUAL THERAPY 1/> REGIONS: CPT | Performed by: PHYSICAL THERAPIST

## 2021-03-15 NOTE — PROGRESS NOTES
Dx: LOREN jt arthropathy, full thickness supraspinatus tear, biceps tenodesis, Distal clavicle resection L shoulder on 2/26/2021          Authorized # of Visits:  n/a         Next MD visit: 4/5/21 scheduled  Fall Risk: Standard         Precautions: n/a AROM (4 wks)    Plan: Continue with passive ROM per protocol.   Date: 3/3/2021  TX#: 2/18 Date: 3/8/2021  TX#: 3/18   Date: 3/11/2021  TX#: 4/18 Date: 3/15/2021  TX#: 5/18 Date:  TX#: 6/ Date:  TX#: 7/ Date:  TX#: 8/   THERE EX 35  MINS THERE EX 45  MINS TH

## 2021-03-17 ENCOUNTER — APPOINTMENT (OUTPATIENT)
Dept: PHYSICAL THERAPY | Age: 51
End: 2021-03-17
Attending: ORTHOPAEDIC SURGERY
Payer: COMMERCIAL

## 2021-03-18 ENCOUNTER — OFFICE VISIT (OUTPATIENT)
Dept: PHYSICAL THERAPY | Age: 51
End: 2021-03-18
Attending: ORTHOPAEDIC SURGERY
Payer: COMMERCIAL

## 2021-03-18 PROCEDURE — 97110 THERAPEUTIC EXERCISES: CPT | Performed by: PHYSICAL THERAPIST

## 2021-03-18 PROCEDURE — 97140 MANUAL THERAPY 1/> REGIONS: CPT | Performed by: PHYSICAL THERAPIST

## 2021-03-18 NOTE — PROGRESS NOTES
Dx: LOREN jt arthropathy, full thickness supraspinatus tear, biceps tenodesis, Distal clavicle resection L shoulder on 2/26/2021          Authorized # of Visits:  n/a         Next MD visit: 4/5/21 scheduled  Fall Risk: Standard         Precautions: n/a stabilization with ADL such as lifting and reaching   · Pt will be independent and compliant with comprehensive HEP to maintain progress achieved in PT   · Pt will demonstrate PROM L shoulder flexion to 150 deg in preparation for AROM (4 wks) MET (3/18/202 gentle end range stretch        MAN THERE 20 MINS MAN THERE 10 MINS      MINS   STM to infraspinatus mm belly, parascapular mm, L ant shoulder Incision scar STM        Gr II GH mobs in post and inf directions with gentle end range stretching       MINS

## 2021-03-22 ENCOUNTER — OFFICE VISIT (OUTPATIENT)
Dept: PHYSICAL THERAPY | Age: 51
End: 2021-03-22
Attending: ORTHOPAEDIC SURGERY
Payer: COMMERCIAL

## 2021-03-22 PROCEDURE — 97110 THERAPEUTIC EXERCISES: CPT | Performed by: PHYSICAL THERAPIST

## 2021-03-22 PROCEDURE — 97140 MANUAL THERAPY 1/> REGIONS: CPT | Performed by: PHYSICAL THERAPIST

## 2021-03-22 NOTE — PROGRESS NOTES
Dx: LOREN jt arthropathy, full thickness supraspinatus tear, biceps tenodesis, Distal clavicle resection L shoulder on 2/26/2021          Authorized # of Visits:  n/a         Next MD visit: 4/5/21 scheduled  Fall Risk: Standard         Precautions: n/a compliant with comprehensive HEP to maintain progress achieved in PT   · Pt will demonstrate PROM L shoulder flexion to 150 deg in preparation for AROM (4 wks) MET (3/18/2021)  · Pt will demonstrate PROM L shoulder abduction to 160 deg in preparation for A Shoulder rolls backward X 20 Shoulder flexion with L bar AAROM X 20 MAN THERE 15 MINS       Scapular retraction X 20 PROM with gentle end range stretch GH mobs post, inf glide with PROM, cross friction STM to incision sites.        MAN THERE 20 MINS MAN THE

## 2021-03-24 ENCOUNTER — OFFICE VISIT (OUTPATIENT)
Dept: PHYSICAL THERAPY | Age: 51
End: 2021-03-24
Attending: ORTHOPAEDIC SURGERY
Payer: COMMERCIAL

## 2021-03-24 PROCEDURE — 97110 THERAPEUTIC EXERCISES: CPT | Performed by: PHYSICAL THERAPIST

## 2021-03-24 PROCEDURE — 97140 MANUAL THERAPY 1/> REGIONS: CPT | Performed by: PHYSICAL THERAPIST

## 2021-03-24 NOTE — PROGRESS NOTES
Dx: LOREN jt arthropathy, full thickness supraspinatus tear, biceps tenodesis, Distal clavicle resection L shoulder on 2/26/2021          Authorized # of Visits:  n/a         Next MD visit: 4/7/21 11:20 am  Fall Risk: Standard         Precautions: n/a abduction to 160 deg in preparation for AROM (4 wks)    Plan: Progress with AAROM per tolerance.   Date: 3/18/2021  TX#: 6/18 Date: 3/22/2021  TX#: 7/18 Date: 3/24/2021  TX#: 8/18 Date:  TX#: 8/ Date:  TX#: 8/ Date:  TX#: 8/ Date:  TX#: 8/ Date:  TX#: 8/

## 2021-03-29 ENCOUNTER — OFFICE VISIT (OUTPATIENT)
Dept: PHYSICAL THERAPY | Age: 51
End: 2021-03-29
Attending: ORTHOPAEDIC SURGERY
Payer: COMMERCIAL

## 2021-03-29 PROCEDURE — 97110 THERAPEUTIC EXERCISES: CPT | Performed by: PHYSICAL THERAPIST

## 2021-03-29 PROCEDURE — 97140 MANUAL THERAPY 1/> REGIONS: CPT | Performed by: PHYSICAL THERAPIST

## 2021-03-29 NOTE — PROGRESS NOTES
Dx: LOREN jt arthropathy, full thickness supraspinatus tear, biceps tenodesis, Distal clavicle resection L shoulder on 2/26/2021          Authorized # of Visits:  n/a         Next MD visit: 4/7/21 11:20 am  Fall Risk: Standard         Precautions: n/a tolerance.   Date: 3/18/2021  TX#: 6/18 Date: 3/22/2021  TX#: 7/18 Date: 3/24/2021  TX#: 8/18 Date: 3/29/2021  TX#: 8/18 Date:  TX#: 8/ Date:  TX#: 8/ Date:  TX#: 8/ Date:  TX#: 8/   THERE EX 35  MINS THERE EX 30  MINS THERE EX 30  MINS THERE EX 45  MINS

## 2021-03-31 ENCOUNTER — OFFICE VISIT (OUTPATIENT)
Dept: PHYSICAL THERAPY | Age: 51
End: 2021-03-31
Attending: ORTHOPAEDIC SURGERY
Payer: COMMERCIAL

## 2021-03-31 PROCEDURE — 97110 THERAPEUTIC EXERCISES: CPT | Performed by: PHYSICAL THERAPIST

## 2021-03-31 PROCEDURE — 97140 MANUAL THERAPY 1/> REGIONS: CPT | Performed by: PHYSICAL THERAPIST

## 2021-03-31 NOTE — PROGRESS NOTES
Dx: LOREN jt arthropathy, full thickness supraspinatus tear, biceps tenodesis, Distal clavicle resection L shoulder on 2/26/2021          Authorized # of Visits:  n/a         Next MD visit: 4/7/21 11:20 am  Fall Risk: Standard         Precautions: n/a shoulder abduction to 160 deg in preparation for AROM (4 wks)    Plan: Progress with AAROM per tolerance.   Date: 3/18/2021  TX#: 6/18 Date: 3/22/2021  TX#: 7/18 Date: 3/24/2021  TX#: 8/18 Date: 3/29/2021  TX#: 8/18 Date: 3/31/2021  TX#: 9/18 Date:  TX#: 8/

## 2021-04-05 ENCOUNTER — OFFICE VISIT (OUTPATIENT)
Dept: PHYSICAL THERAPY | Age: 51
End: 2021-04-05
Attending: ORTHOPAEDIC SURGERY
Payer: COMMERCIAL

## 2021-04-05 PROCEDURE — 97110 THERAPEUTIC EXERCISES: CPT | Performed by: PHYSICAL THERAPIST

## 2021-04-05 NOTE — PROGRESS NOTES
Dx: LOREN jt arthropathy, full thickness supraspinatus tear, biceps tenodesis, Distal clavicle resection L shoulder on 2/26/2021          Authorized # of Visits:  n/a         Next MD visit: 4/7/21 11:20 am  Fall Risk: Standard         Precautions: n/a Physical Therapy.       Goals:   (To be met in 18 visits)   · Pt will report improved ability to sleep without waking due to shoulder pain MET (4/5/2021)  · Pt will improve shoulder flexion AROM to >165 degrees to be able to reach into overhead cabinets wit this letter via fax as soon as possible to [phone]. I certify the need for these services furnished under this plan of treatment and while under my care.     X___________________________________________________ Date____________________    [facility] 10 MINS Reassessment     MAN THERE 10 MINS     mobs Gr III, cross friction STM to post sh incision with gentle end range stretching.       Incision scar STM                                                                  Charge

## 2021-04-07 ENCOUNTER — OFFICE VISIT (OUTPATIENT)
Dept: ORTHOPEDICS CLINIC | Facility: CLINIC | Age: 51
End: 2021-04-07
Payer: COMMERCIAL

## 2021-04-07 VITALS — HEART RATE: 65 BPM | OXYGEN SATURATION: 99 %

## 2021-04-07 DIAGNOSIS — Z98.890 S/P ARTHROSCOPY OF SHOULDER: Primary | ICD-10-CM

## 2021-04-07 PROCEDURE — 99024 POSTOP FOLLOW-UP VISIT: CPT | Performed by: ORTHOPAEDIC SURGERY

## 2021-04-07 NOTE — PROGRESS NOTES
He is doing very       [facility]  [address] [facility] [address] [facility] [phone]     Name: Trudy Ellsworth   MRN: [id number]  Date: 3/4/2021     REASON FOR VISIT: Second Post-Surgical Visit  Date of Em Archibald rotation to the TL junction. He is doing quite well with excellent active range of motion. No obvious peripheral edema noted. Distal neurovascular exam demonstrates normal perfusion, intact sensation to light touch and full strength.        IMPRESSION

## 2021-04-08 ENCOUNTER — OFFICE VISIT (OUTPATIENT)
Dept: PHYSICAL THERAPY | Age: 51
End: 2021-04-08
Attending: ORTHOPAEDIC SURGERY
Payer: COMMERCIAL

## 2021-04-08 PROCEDURE — 97110 THERAPEUTIC EXERCISES: CPT | Performed by: PHYSICAL THERAPIST

## 2021-04-08 NOTE — PROGRESS NOTES
Dx: LOREN jt arthropathy, full thickness supraspinatus tear, biceps tenodesis, Distal clavicle resection L shoulder on 2/26/2021          Authorized # of Visits:  n/a         Next MD visit: 4/7/21 11:20 am  Fall Risk: Standard         Precautions: n/a tuck in shirt, and turn steering wheel without pain  · Pt will improve shoulder strength throughout to 5/5 to improve function with ADLs including overhead lifting of 5#  · Pt will demonstrate increased mid/low trap strength to 4/5 to promote improved shou

## 2021-04-19 ENCOUNTER — OFFICE VISIT (OUTPATIENT)
Dept: PHYSICAL THERAPY | Age: 51
End: 2021-04-19
Attending: ORTHOPAEDIC SURGERY
Payer: COMMERCIAL

## 2021-04-19 PROCEDURE — 97110 THERAPEUTIC EXERCISES: CPT | Performed by: PHYSICAL THERAPIST

## 2021-04-19 NOTE — PROGRESS NOTES
Dx: LOREN jt arthropathy, full thickness supraspinatus tear, biceps tenodesis, Distal clavicle resection L shoulder on 2/26/2021          Authorized # of Visits:  n/a         Next MD visit: 6/2/21  8:20 am  Fall Risk: Standard         Precautions: n/a fasten seatbelt   · Pt will increase shoulder AROM IR to 75 to be able to reach in back pocket, tuck in shirt, and turn steering wheel without pain  · Pt will improve shoulder strength throughout to 5/5 to improve function with ADLs including overhead lift

## 2021-04-21 ENCOUNTER — OFFICE VISIT (OUTPATIENT)
Dept: PHYSICAL THERAPY | Age: 51
End: 2021-04-21
Attending: ORTHOPAEDIC SURGERY
Payer: COMMERCIAL

## 2021-04-21 PROCEDURE — 97140 MANUAL THERAPY 1/> REGIONS: CPT | Performed by: PHYSICAL THERAPIST

## 2021-04-21 PROCEDURE — 97110 THERAPEUTIC EXERCISES: CPT | Performed by: PHYSICAL THERAPIST

## 2021-04-21 NOTE — PROGRESS NOTES
Dx: LOREN jt arthropathy, full thickness supraspinatus tear, biceps tenodesis, Distal clavicle resection L shoulder on 2/26/2021          Authorized # of Visits:  n/a         Next MD visit: 6/2/21  8:20 am  Fall Risk: Standard         Precautions: n/a reach and fasten seatbelt   · Pt will increase shoulder AROM IR to 75 to be able to reach in back pocket, tuck in shirt, and turn steering wheel without pain  · Pt will improve shoulder strength throughout to 5/5 to improve function with ADLs including ove ex X 2, man there X 1   Total Timed Treatment: 45 min  Total Treatment Time: 45 min

## 2021-04-26 ENCOUNTER — OFFICE VISIT (OUTPATIENT)
Dept: PHYSICAL THERAPY | Age: 51
End: 2021-04-26
Attending: ORTHOPAEDIC SURGERY
Payer: COMMERCIAL

## 2021-04-26 PROCEDURE — 97110 THERAPEUTIC EXERCISES: CPT | Performed by: PHYSICAL THERAPIST

## 2021-04-26 NOTE — PROGRESS NOTES
Dx: LOREN jt arthropathy, full thickness supraspinatus tear, biceps tenodesis, Distal clavicle resection L shoulder on 2/26/2021          Authorized # of Visits:  n/a         Next MD visit: 6/2/21  8:20 am  Fall Risk: Standard         Precautions: n/a shoulder AROM IR to 75 to be able to reach in back pocket, tuck in shirt, and turn steering wheel without pain  · Pt will improve shoulder strength throughout to 5/5 to improve function with ADLs including overhead lifting of 5#  · Pt will demonstrate incr 15      Standing el sh flex AROM 2 X 10 GH mobs post, inf, lat Gr IV with end range stretching  Sleeper stretch in standing 10 sec X 10      Standing sh abd biil AROM 2 X 10  MAN THERE 5 MINS      Sh IR/ ER  GTB X 20  GH mobs post, inf Gr II, post jt line

## 2021-04-28 ENCOUNTER — OFFICE VISIT (OUTPATIENT)
Dept: PHYSICAL THERAPY | Age: 51
End: 2021-04-28
Attending: ORTHOPAEDIC SURGERY
Payer: COMMERCIAL

## 2021-04-28 PROCEDURE — 97140 MANUAL THERAPY 1/> REGIONS: CPT | Performed by: PHYSICAL THERAPIST

## 2021-04-28 PROCEDURE — 97110 THERAPEUTIC EXERCISES: CPT | Performed by: PHYSICAL THERAPIST

## 2021-04-28 NOTE — PROGRESS NOTES
Dx: LOREN jt arthropathy, full thickness supraspinatus tear, biceps tenodesis, Distal clavicle resection L shoulder on 2/26/2021          Authorized # of Visits:  n/a         Next MD visit: 6/2/21  8:20 am  Fall Risk: Standard         Precautions: n/a pain or restriction PROGRESSING  · Pt will improve shoulder abduction AROM to >165 degrees to improve ability to don deodorant, don/doff shirts, and wash hair PROGRESSING  · Pt will increase shoulder AROM ER to 80 to reach and fasten seatbelt   · Pt will i to overhead 2 X 10 L Supine sh flexion 2# 2 X 10 TRX shoulder flex, abd X 20 ea Self wall stretch in flex, abd, ER 10 sec X 3 Supine sh flex AROM X 20    Side lying sh ER @ 0 deg abd 2 X 10 L Side lying sh ER 2# 2 X 10 Shoulder IR/ ER GTB 2 X 10 Side lying

## 2021-05-03 ENCOUNTER — APPOINTMENT (OUTPATIENT)
Dept: PHYSICAL THERAPY | Age: 51
End: 2021-05-03
Attending: ORTHOPAEDIC SURGERY
Payer: COMMERCIAL

## 2021-05-05 ENCOUNTER — APPOINTMENT (OUTPATIENT)
Dept: PHYSICAL THERAPY | Age: 51
End: 2021-05-05
Attending: ORTHOPAEDIC SURGERY
Payer: COMMERCIAL

## 2021-05-06 ENCOUNTER — APPOINTMENT (OUTPATIENT)
Dept: PHYSICAL THERAPY | Age: 51
End: 2021-05-06
Attending: ORTHOPAEDIC SURGERY
Payer: COMMERCIAL

## 2021-05-10 ENCOUNTER — OFFICE VISIT (OUTPATIENT)
Dept: PHYSICAL THERAPY | Age: 51
End: 2021-05-10
Attending: ORTHOPAEDIC SURGERY
Payer: COMMERCIAL

## 2021-05-10 PROCEDURE — 97140 MANUAL THERAPY 1/> REGIONS: CPT | Performed by: PHYSICAL THERAPIST

## 2021-05-10 PROCEDURE — 97110 THERAPEUTIC EXERCISES: CPT | Performed by: PHYSICAL THERAPIST

## 2021-05-10 NOTE — PROGRESS NOTES
Dx: LOREN jt arthropathy, full thickness supraspinatus tear, biceps tenodesis, Distal clavicle resection L shoulder on 2/26/2021          Authorized # of Visits:  n/a         Next MD visit: 6/2/21  8:20 am  Fall Risk: Standard         Precautions: n/a IR to 75 to be able to reach in back pocket, tuck in shirt, and turn steering wheel without pain  · Pt will improve shoulder strength throughout to 5/5 to improve function with ADLs including overhead lifting of 5#  · Pt will demonstrate increased mid/low

## 2021-05-12 ENCOUNTER — OFFICE VISIT (OUTPATIENT)
Dept: PHYSICAL THERAPY | Age: 51
End: 2021-05-12
Attending: ORTHOPAEDIC SURGERY
Payer: COMMERCIAL

## 2021-05-12 ENCOUNTER — TELEPHONE (OUTPATIENT)
Dept: PHYSICAL THERAPY | Facility: HOSPITAL | Age: 51
End: 2021-05-12

## 2021-05-12 PROCEDURE — 97110 THERAPEUTIC EXERCISES: CPT | Performed by: PHYSICAL THERAPIST

## 2021-05-12 PROCEDURE — 97140 MANUAL THERAPY 1/> REGIONS: CPT | Performed by: PHYSICAL THERAPIST

## 2021-05-12 NOTE — PROGRESS NOTES
Dx: LOREN jt arthropathy, full thickness supraspinatus tear, biceps tenodesis, Distal clavicle resection L shoulder on 2/26/2021          Authorized # of Visits:  n/a         Next MD visit: 6/2/21  8:20 am  Fall Risk: Standard         Precautions: n/a reach and fasten seatbelt   · Pt will increase shoulder AROM IR to 75 to be able to reach in back pocket, tuck in shirt, and turn steering wheel without pain  · Pt will improve shoulder strength throughout to 5/5 to improve function with ADLs including ove Treatment Time: 35 min

## 2021-05-17 ENCOUNTER — OFFICE VISIT (OUTPATIENT)
Dept: PHYSICAL THERAPY | Age: 51
End: 2021-05-17
Attending: ORTHOPAEDIC SURGERY
Payer: COMMERCIAL

## 2021-05-17 PROCEDURE — 97110 THERAPEUTIC EXERCISES: CPT | Performed by: PHYSICAL THERAPIST

## 2021-05-17 PROCEDURE — 97140 MANUAL THERAPY 1/> REGIONS: CPT | Performed by: PHYSICAL THERAPIST

## 2021-05-17 NOTE — PROGRESS NOTES
Dx: LOREN jt arthropathy, full thickness supraspinatus tear, biceps tenodesis, Distal clavicle resection L shoulder on 2/26/2021          Authorized # of Visits:  n/a         Next MD visit: 6/2/21  8:20 am  Fall Risk: Standard         Precautions: n/a to reach and fasten seatbelt   · Pt will increase shoulder AROM IR to 75 to be able to reach in back pocket, tuck in shirt, and turn steering wheel without pain  · Pt will improve shoulder strength throughout to 5/5 to improve function with ADLs including STM, IASTM to supraspinatus fossa, post sh jt line, ant shoulder, AC jt post mobs calvicle post rotation mobs, scapular mobs gr III                                                       Charges:  There ex X 2, man there X 1   Total Timed Treatment: 45 min

## 2021-05-19 ENCOUNTER — APPOINTMENT (OUTPATIENT)
Dept: PHYSICAL THERAPY | Age: 51
End: 2021-05-19
Attending: ORTHOPAEDIC SURGERY
Payer: COMMERCIAL

## 2021-05-24 ENCOUNTER — APPOINTMENT (OUTPATIENT)
Dept: PHYSICAL THERAPY | Age: 51
End: 2021-05-24
Attending: ORTHOPAEDIC SURGERY
Payer: COMMERCIAL

## 2021-05-26 ENCOUNTER — APPOINTMENT (OUTPATIENT)
Dept: PHYSICAL THERAPY | Age: 51
End: 2021-05-26
Attending: ORTHOPAEDIC SURGERY
Payer: COMMERCIAL

## 2021-05-27 ENCOUNTER — APPOINTMENT (OUTPATIENT)
Dept: PHYSICAL THERAPY | Age: 51
End: 2021-05-27
Attending: ORTHOPAEDIC SURGERY
Payer: COMMERCIAL

## 2021-06-01 ENCOUNTER — OFFICE VISIT (OUTPATIENT)
Dept: PHYSICAL THERAPY | Age: 51
End: 2021-06-01
Attending: ORTHOPAEDIC SURGERY
Payer: COMMERCIAL

## 2021-06-01 PROCEDURE — 97110 THERAPEUTIC EXERCISES: CPT | Performed by: PHYSICAL THERAPIST

## 2021-06-01 NOTE — PROGRESS NOTES
Dx: LOREN jt arthropathy, full thickness supraspinatus tear, biceps tenodesis, Distal clavicle resection L shoulder on 2/26/2021          Authorized # of Visits:  n/a         Next MD visit: 6/2/21  8:20 am  Fall Risk: Standard         Precautions: n/a deficits and will benefit from continuing skilled PT services for return to full functioning without limitations. Thank you for referring Russ Dean to [facility].     Goals:   (To be met in 18 visits)   · Pt will report improved ability to sleep w care.    Thank you for your referral. If you have any questions, please contact me at Dept: [phone].     Sincerely,  Electronically signed by therapist: Savannah Quick PT     Physician's certification required:  Yes  Please co-sign or sign and retur

## 2021-06-08 ENCOUNTER — OFFICE VISIT (OUTPATIENT)
Dept: PHYSICAL THERAPY | Age: 51
End: 2021-06-08
Attending: ORTHOPAEDIC SURGERY
Payer: COMMERCIAL

## 2021-06-08 PROCEDURE — 97110 THERAPEUTIC EXERCISES: CPT | Performed by: PHYSICAL THERAPIST

## 2021-06-08 NOTE — PROGRESS NOTES
Dx: LOREN jt arthropathy, full thickness supraspinatus tear, biceps tenodesis, Distal clavicle resection L shoulder on 2/26/2021          Authorized # of Visits:  n/a         Next MD visit: 6/2/21  8:20 am  Fall Risk: Standard         Precautions: n/a deodorant, don/doff shirts, and wash hair MET (6/1/2021)  · Pt will increase shoulder AROM ER to 80 to reach and fasten seatbelt PROGRESSING  · Pt will increase shoulder AROM IR to 75 to be able to reach in back pocket, tuck in shirt, and turn steering whe ER.                                                                                    Charges:  There ex X 3   Total Timed Treatment: 40 min  Total Treatment Time: 40 min

## 2021-06-24 ENCOUNTER — OFFICE VISIT (OUTPATIENT)
Dept: PHYSICAL THERAPY | Age: 51
End: 2021-06-24
Attending: ORTHOPAEDIC SURGERY
Payer: COMMERCIAL

## 2021-06-24 PROCEDURE — 97140 MANUAL THERAPY 1/> REGIONS: CPT | Performed by: PHYSICAL THERAPIST

## 2021-06-24 PROCEDURE — 97110 THERAPEUTIC EXERCISES: CPT | Performed by: PHYSICAL THERAPIST

## 2021-06-24 NOTE — PROGRESS NOTES
Dx: LOREN jt arthropathy, full thickness supraspinatus tear, biceps tenodesis, Distal clavicle resection L shoulder on 2/26/2021          Authorized # of Visits:  n/a         Next MD visit: 6/2/21  8:20 am  Fall Risk: Standard         Precautions: n/a shoulder flexion AROM to >165 degrees to be able to reach into overhead cabinets without pain or restriction PROGRESSING  · Pt will improve shoulder abduction AROM to >165 degrees to improve ability to don deodorant, don/doff shirts, and wash hair MET (6/1 Cross body post capsule stretch 10 sec X 10 Rows R tubing X 20 Shoulder IR/ ER @ 0 deg abd GTB 2 X 15 Shoulder flex YTB in standing X 10       MAN THERE 20 MINS 3 way shoulder flex, scaption, abd X 10 ea MAN THERE 10 MINS Shoulder abd YTB in standing X

## 2021-07-07 ENCOUNTER — OFFICE VISIT (OUTPATIENT)
Dept: PHYSICAL THERAPY | Age: 51
End: 2021-07-07
Attending: ORTHOPAEDIC SURGERY
Payer: COMMERCIAL

## 2021-07-07 PROCEDURE — 97110 THERAPEUTIC EXERCISES: CPT | Performed by: PHYSICAL THERAPIST

## 2021-07-07 NOTE — PROGRESS NOTES
Dx: LOREN jt arthropathy, full thickness supraspinatus tear, biceps tenodesis, Distal clavicle resection L shoulder on 2/26/2021          Authorized # of Visits:  n/a         Next MD visit: 6/2/21  8:20 am  Fall Risk: Standard         Precautions: n/a L 3-/5 R 4+/5  /  L 4-/5 R 4+/5  /  L 4+/5         FOTO 58/100 59/100          Assessment: Mr. Malou Villeda demonstrates improvement in shoulder ROM, strength and function since last progress note.  Pt is now able to function with L arm without discomfort and h actively participate in planning and for this course of care. Thank you for your referral. If you have any questions, please contact me at Dept: [phone].     Sincerely,  Electronically signed by therapist: Antelmo Cheema PT     Physician's certif 15 G tubing          MAN THERE 10 MINS          GH mobs Gr III post, inf with end range stretching in abd, ER. Charges:  There ex X 2   Total Timed Treatment: 30 min  Total Treatment Time: 30 min

## 2021-07-14 ENCOUNTER — OFFICE VISIT (OUTPATIENT)
Dept: ORTHOPEDICS CLINIC | Facility: CLINIC | Age: 51
End: 2021-07-14
Payer: COMMERCIAL

## 2021-07-14 ENCOUNTER — APPOINTMENT (OUTPATIENT)
Dept: PHYSICAL THERAPY | Age: 51
End: 2021-07-14
Attending: ORTHOPAEDIC SURGERY
Payer: COMMERCIAL

## 2021-07-14 DIAGNOSIS — Z98.890 S/P ARTHROSCOPY OF SHOULDER: Primary | ICD-10-CM

## 2021-07-14 PROCEDURE — 99213 OFFICE O/P EST LOW 20 MIN: CPT | Performed by: ORTHOPAEDIC SURGERY

## 2021-07-14 NOTE — PROGRESS NOTES
[facility]  [address] [facility] [address] [facility] [place] [address]     Name: Bobby Whittaker   MRN: [id number]  Date: 7/14/2021    REASON FOR VISIT: Final Post-Surgical Visit  Date of Surgery: 2/26/2021–l cuff musculature's. No obvious peripheral edema noted. Distal neurovascular exam demonstrates normal perfusion, intact sensation to light touch and full strength.        IMPRESSION: Lee Hendrickson. is a 45year old male 4-1/2 months s/p left

## 2021-07-21 ENCOUNTER — APPOINTMENT (OUTPATIENT)
Dept: PHYSICAL THERAPY | Age: 51
End: 2021-07-21
Attending: ORTHOPAEDIC SURGERY
Payer: COMMERCIAL

## 2021-07-28 ENCOUNTER — APPOINTMENT (OUTPATIENT)
Dept: PHYSICAL THERAPY | Age: 51
End: 2021-07-28
Attending: ORTHOPAEDIC SURGERY
Payer: COMMERCIAL

## 2021-11-04 ENCOUNTER — PATIENT MESSAGE (OUTPATIENT)
Dept: FAMILY MEDICINE CLINIC | Facility: CLINIC | Age: 51
End: 2021-11-04

## 2021-11-04 DIAGNOSIS — Z00.00 ROUTINE GENERAL MEDICAL EXAMINATION AT A HEALTH CARE FACILITY: Primary | ICD-10-CM

## 2021-11-04 DIAGNOSIS — Z12.5 SCREENING FOR PROSTATE CANCER: ICD-10-CM

## 2021-11-04 DIAGNOSIS — Z12.11 COLON CANCER SCREENING: ICD-10-CM

## 2021-11-04 NOTE — TELEPHONE ENCOUNTER
From: Giovani Heredia. To: Sravani Tellez MD  Sent: 11/4/2021 8:51 AM CDT  Subject: Blood work and colonoscopy order    Dr. Scuhyler Balderrama - I hope you are doing well. I would like your help with a couple things.     1) can you please put in an order f

## 2021-11-04 NOTE — TELEPHONE ENCOUNTER
Referral and labs pended. Please advise on any other labs   No future appointments.   Will advise patient to schedule

## 2021-11-11 ENCOUNTER — PATIENT MESSAGE (OUTPATIENT)
Dept: FAMILY MEDICINE CLINIC | Facility: CLINIC | Age: 51
End: 2021-11-11

## 2021-11-11 NOTE — TELEPHONE ENCOUNTER
From: Lee Hendrickson. To: Shubham Calderon MD  Sent: 11/11/2021 10:53 AM CST  Subject: Bloodwork & screenings    Dr. Lili Bonilla - I hope you are doing well. I would like your help with a couple things, please.      1) can you please put in an order fo

## 2021-11-18 ENCOUNTER — TELEPHONE (OUTPATIENT)
Dept: FAMILY MEDICINE CLINIC | Facility: CLINIC | Age: 51
End: 2021-11-18

## 2021-11-18 NOTE — TELEPHONE ENCOUNTER
Spoke with patient and he states that he has scheduled a colonoscopy with Dr Wayne Jessica on Dec 14th. He states that he wants to make a physical appt with Dr Fredy Valencia.  He wants to see him as his pcp, but he will call back to do so

## 2021-12-07 ENCOUNTER — LAB ENCOUNTER (OUTPATIENT)
Dept: LAB | Age: 51
End: 2021-12-07
Attending: FAMILY MEDICINE
Payer: COMMERCIAL

## 2021-12-07 ENCOUNTER — TELEPHONE (OUTPATIENT)
Dept: FAMILY MEDICINE CLINIC | Facility: CLINIC | Age: 51
End: 2021-12-07

## 2021-12-07 DIAGNOSIS — Z12.5 SCREENING FOR PROSTATE CANCER: ICD-10-CM

## 2021-12-07 DIAGNOSIS — Z00.00 ROUTINE GENERAL MEDICAL EXAMINATION AT A HEALTH CARE FACILITY: ICD-10-CM

## 2021-12-07 PROCEDURE — 80061 LIPID PANEL: CPT | Performed by: FAMILY MEDICINE

## 2021-12-07 PROCEDURE — 84443 ASSAY THYROID STIM HORMONE: CPT

## 2021-12-07 PROCEDURE — 80053 COMPREHEN METABOLIC PANEL: CPT | Performed by: FAMILY MEDICINE

## 2021-12-07 PROCEDURE — 85025 COMPLETE CBC W/AUTO DIFF WBC: CPT | Performed by: FAMILY MEDICINE

## 2021-12-07 PROCEDURE — 84439 ASSAY OF FREE THYROXINE: CPT

## 2021-12-07 PROCEDURE — 36415 COLL VENOUS BLD VENIPUNCTURE: CPT | Performed by: FAMILY MEDICINE

## 2021-12-07 NOTE — TELEPHONE ENCOUNTER
Appointment For: [employer] ([id number])   Visit Type: MYCHART PHYSICAL (2963)      12/18/2021  10:40 AM  20 mins. Kevin Gutierrez MD       [facility] [address]      Patient Comments:   lab results.  discuss chest tightness.      Patient booke

## 2021-12-08 ENCOUNTER — OFFICE VISIT (OUTPATIENT)
Dept: FAMILY MEDICINE CLINIC | Facility: CLINIC | Age: 51
End: 2021-12-08
Payer: COMMERCIAL

## 2021-12-08 VITALS
BODY MASS INDEX: 26.87 KG/M2 | HEART RATE: 78 BPM | OXYGEN SATURATION: 98 % | SYSTOLIC BLOOD PRESSURE: 130 MMHG | TEMPERATURE: 98 F | HEIGHT: 72 IN | RESPIRATION RATE: 16 BRPM | WEIGHT: 198.38 LBS | DIASTOLIC BLOOD PRESSURE: 80 MMHG

## 2021-12-08 DIAGNOSIS — Z00.00 ROUTINE ADULT HEALTH MAINTENANCE: Primary | ICD-10-CM

## 2021-12-08 DIAGNOSIS — R53.83 FATIGUE, UNSPECIFIED TYPE: ICD-10-CM

## 2021-12-08 DIAGNOSIS — Z23 NEED FOR VACCINATION: ICD-10-CM

## 2021-12-08 DIAGNOSIS — Z13.6 ISCHEMIC HEART DISEASE SCREEN: ICD-10-CM

## 2021-12-08 DIAGNOSIS — R07.9 CHEST PAIN, UNSPECIFIED TYPE: ICD-10-CM

## 2021-12-08 PROCEDURE — 90686 IIV4 VACC NO PRSV 0.5 ML IM: CPT | Performed by: FAMILY MEDICINE

## 2021-12-08 PROCEDURE — 3008F BODY MASS INDEX DOCD: CPT | Performed by: FAMILY MEDICINE

## 2021-12-08 PROCEDURE — 90471 IMMUNIZATION ADMIN: CPT | Performed by: FAMILY MEDICINE

## 2021-12-08 PROCEDURE — 99396 PREV VISIT EST AGE 40-64: CPT | Performed by: FAMILY MEDICINE

## 2021-12-08 PROCEDURE — 3075F SYST BP GE 130 - 139MM HG: CPT | Performed by: FAMILY MEDICINE

## 2021-12-08 PROCEDURE — 99214 OFFICE O/P EST MOD 30 MIN: CPT | Performed by: FAMILY MEDICINE

## 2021-12-08 PROCEDURE — 90472 IMMUNIZATION ADMIN EACH ADD: CPT | Performed by: FAMILY MEDICINE

## 2021-12-08 PROCEDURE — 90750 HZV VACC RECOMBINANT IM: CPT | Performed by: FAMILY MEDICINE

## 2021-12-08 PROCEDURE — 3079F DIAST BP 80-89 MM HG: CPT | Performed by: FAMILY MEDICINE

## 2021-12-09 ENCOUNTER — ORDER TRANSCRIPTION (OUTPATIENT)
Dept: ADMINISTRATIVE | Facility: HOSPITAL | Age: 51
End: 2021-12-09

## 2021-12-09 DIAGNOSIS — Z13.6 SCREENING FOR CARDIOVASCULAR CONDITION: Primary | ICD-10-CM

## 2021-12-12 PROBLEM — R07.9 CHEST PAIN: Status: ACTIVE | Noted: 2021-12-12

## 2021-12-12 PROBLEM — R53.83 FATIGUE: Status: ACTIVE | Noted: 2021-12-12

## 2021-12-12 NOTE — PROGRESS NOTES
Lee Hendrickson. is a 45year old male who presents for a complete physical exam.   HPI:   Patient presents with:  Physical: PHQ2: 0, CSSR: neg  Lab Results: completed 12/7/21 - LIPID panel still pending  Immunization/Injection: flu vaccine today Hypertension Father    • Diabetes Maternal Grandfather    • Heart Disorder Paternal Grandmother    • Cancer Paternal Grandfather         lung    • Other (prostate cancer) Other         uncle      Social History:  Social History    Tobacco Use      Smoking wheezes/ronchi/rales/crackles, normal air excursion  CARDIOVASCULAR: RRR, no murmur, no lower extremity edema, pedal and femoral pulses 2+ and symmetric b/l  GI: normoactive BS, non-distended, non-tender to palpation, no HSM/masses/pulsations  MUSCULOSKELE ZOSTER VACC RECOMBINANT IM NJX  -     FLULAVAL INFLUENZA VACCINE QUAD PRESERVATIVE FREE 0.5 ML       Regarding the chest pain I would recommend getting a cardiac stress test done as well as a heart scan to rule out coronary artery disease.   It could be al

## 2021-12-18 ENCOUNTER — LAB ENCOUNTER (OUTPATIENT)
Dept: LAB | Age: 51
End: 2021-12-18
Attending: FAMILY MEDICINE
Payer: COMMERCIAL

## 2021-12-18 DIAGNOSIS — R07.9 CHEST PAIN, UNSPECIFIED TYPE: ICD-10-CM

## 2021-12-21 ENCOUNTER — HOSPITAL ENCOUNTER (OUTPATIENT)
Dept: CV DIAGNOSTICS | Age: 51
Discharge: HOME | End: 2021-12-21
Attending: FAMILY MEDICINE
Payer: COMMERCIAL

## 2021-12-21 DIAGNOSIS — R07.9 CHEST PAIN, UNSPECIFIED TYPE: ICD-10-CM

## 2021-12-21 PROCEDURE — 93017 CV STRESS TEST TRACING ONLY: CPT | Performed by: FAMILY MEDICINE

## 2021-12-21 PROCEDURE — 93018 CV STRESS TEST I&R ONLY: CPT | Performed by: FAMILY MEDICINE

## 2022-02-17 LAB
CHOLESTEROL, SERUM OR PLASMA (ARUP): 215
HDL CHOLESTEROL (ARUP): 59
LDL CHOLESTEROL, CALCULATED (ARUP): 131
NON-HDL CHOLESTEROL (ARUP): 156
TRIGLYCERIDES, SERUM OR PLASMA (ARUP): 126
VLDL, CALCULATED (ARUP): 25

## 2022-03-23 ENCOUNTER — HOSPITAL ENCOUNTER (OUTPATIENT)
Dept: CT IMAGING | Age: 52
Discharge: HOME | End: 2022-03-23
Attending: FAMILY MEDICINE

## 2022-03-23 DIAGNOSIS — Z13.6 SCREENING FOR CARDIOVASCULAR CONDITION: ICD-10-CM

## 2023-04-03 NOTE — PROGRESS NOTES
Subjective:       Patient ID: Ade Castellano is a 75 y.o. female.    Chief Complaint: Breast Cancer         Diagnosis:   History of Stage 1A  pT1c  pN0 cM0 Rt breast cancer Grade 3, ER/DE neg , Her 2 jose maria neg s/p lumpectomy 7/11/2014 and s/p adjuvant RT 9/2014   She completed post operative radiation therapy at 400 cGy per fraction to 4000 cGy 9/2014    Stage IV cancer squamous cell CA lung 2/14/2018 PD-L1 10% lowEGFR NEG ALK NEG BRAF NEG  s/p  cycle 6 of carboplatin/Abraxane 7/2/2018   Recurrent breast cancer diagnosed 3/2019  She is s/p AC q21d x 4 cycles completed 9/6/2019   She  completed  RT 12/16/2019 The right axilla and right supraclavicular region was treated at 200 cGy per fraction to 4400 cGy. The PET(+) disease in the right axilla and right supraclavicular fossa will be boosted at 200 cGy per fraction to 6000 cGy total dose.  S/p LYMPH NODE, RIGHT AXILLA, BIOPSY: 6/16/21 . Metastatic poorly-differentiated carcinoma, c/w breast primary ERnegPRneg Her2 neg  PD-L1 (22C3) IHC CPS> is greater than or equal to 10  Pembrolizumab 7/22/21 -present        Prior Hx:  74 y/o female with history of  Stage IV cancer squamous cell CA lung  And Rt  breast Haja/p  cycle 6 of carboplatin/Abraxane 7/2/2018   She has  History of Stage 1A  Rt breast cancer Grade 3, ER/DE neg , Her 2 jose maria neg s/p lumpectomy 7/11/2014 and s/p adjuvant RT 9/2014 Pt declined Adjuvant chemo.Pathology showed a 1.15 cm, grade 3 infiltrating ductal carcinoma.  One sentinel lymph node was negative for malignancy.  Margins were negative and the closest margin was 1 cm.  She was staged  pT1c  pN0 cM0 stage IA.  She declined adjuvant chemo therapy.  She completed post operative radiation therapy at 400 cGy per fraction to 4000 cGy 9/2014  Pt hospitalized 11/2017 for  acute on chronic respiratory failure requiring intubation and ventilation. Has large lung mass in right lung with probable post obstructive pneumonia resulting in COPD exacerbation.CTA  CHIEF COMPLAINT:     Patient presents with:  Sore Throat: s/s for 1 month  Rash: torsal, s/s for 5 days.   OTC Benadryl  Chest Congestion: head congestion      HPI:   Yvonne Zhong is a 45year old male who presents with complaints of feeling sick for on chest 11/29/2017 revealed   Large right hilar mass with involvement of adjacent segmental pulmonary arterial branches and bronchi with associated postobstructive atelectasis and volume loss in the RML  with adjacent ground glass opacity concerning for underlying neoplastic process. Mediastinal and axillary adenopathy, with a right axillary lymph node measuring up to 2.0 cm in short axis diameter.She underwent rt axillary LN bx at outside facility- on 1/16/2018 at [facility]. Pathology revealed metastatic poorly differentiated carcinoma of unknown primary site. She next underwent lung bx at [facility] 1/31/2018  benign lung tissue. Repeat Right Lung Bx 2/14/2018 Pathology reveals Squamous cell carcinoma PD-L1 10% low expression EGFR NEG ALK NEG BRAF NEG. Outside slides axillary LN specimen were reviewed/comparison to lung bx findings . She completed    cycle 6 of carboplatin/Abraxane completed 7/2018 .PET/CT  4/19/2018 revealed there has been a excellent response to therapy.  At least 90% reduction in malignant activity.PET/CT 2/21/2019 increased size and irregularity of the right axillary lymph node with increased FDG avidityMAMMO/US rt breast 2/2019 revealed suspicious findings rt axilla LN.  Right axilla, mass, core biopsy: Positive for poorly differentiated carcinoma breast primary ER neg/DC neg Her2 neg.[facility] for outside reviewIt was determined  the lung tumor corresponds to a squamous cellcarcinoma and appears to be unrelated to the invasive ductal carcinoma of the breast. The axillary mass, in myopinion, corresponds to metastases of the breast primary. Although it is a poorly differentiated carcinoma, theimmunophenotype is most consistent with the one that the breast primary exhibited, and is inconsistent with metastatic squamous cell carcinoma. She was treated with  AC ( adriamycin 60m/m2/Cytoxan 600mg/m2)  q21d x 4 cycles completed 9/6/2019 . PET/CT 9/19/2019 shows disease response l decrease in  chest pain, or palpitations  LUNGS: See HPI  GI: Denies abdominal pain, N/V/C/D.   MUSCULOSKELETAL: no arthralgia or swollen joints  LYMPH:  Denies lymphadenopathy  NEURO: Denies headaches or lightheadedness      EXAM:   /90   Pulse 84   Temp 98.4 °F size and uptake of several right axillary lymph nodes and a supraclavicular lymph node.  Mild residual activity may indicate viable tumor.Pt followed by Rad/Onc. She was presented at [facility] tumor board and it was determined to proceed Radiation therapy only. No ALND due to concurrent lung and supraclavicular node. She  completed  RT 12/16/2019  To right axilla and right supraclavicular region PET/CT imaging  5/20/21 shows Continued increase in both size and hypermetabolic activity of right axillary level 1 lymph nodes a new, mildly hypermetabolic cutaneous thickening of the right breast. she underwent LYMPH NODE, RIGHT AXILLA, BIOPSY: 6/16/21 . Pathology showed Metastatic poorly-differentiated carcinoma, consistent with breast primary-ERneg/ PRneg  HER2  neg  Pt started on pembrolizumab 7/2021  Pt hospitalized 4/6/22 with hypokalemia and orthostatic hypotension  S/p C16 pembrolizumab 6/15/22  ( 400mg q6wks)      Interval Hx:Less fatigued  Cough improved  S/p C16 pembrolizumab 6/15/22  ( 400mg q6wks)  PET /CT  11/21/22 shows New right lower lobe infiltrate worrisome for pneumonia or aspiration.Chronic infiltrate right middle lobe.   Resolution of the left lower lobe infiltrate  Pt  treated by pulmonologist for pneumonia   She continues with mild LOGAN  She is able to still cut grass if she takes 2 days to do it   No fevers  Appetite and weight stable  She has supp 02 at home     PET /CT 1/11/23 there is stable right breast skin thickening with mild radiotracer uptake.Interval resolution of hypermetabolic opacification in the left lower lobe as compared to FDG PET-CT 07/14/2022.  Interval improvement in patchy opacities in the right lower lobe as compared to CT 11/21/2022.         She is followed by Dr. Katz, pulmonology    She also has been followed by GI for dysphagia  In past  She has undergone dilation      Last Mammo 6/16/21  No mammographic evidence of malignancy.BI-RADS Category 1: Negative              "PrevHx:She had an abnormal mammogram 6/4/2014 whichRevealed a round solid mass 6mm in rt breast.She then underwent U/S guided core bx of rt breast mass on 6/17/2014.Pathology revealed infiltrating ductal carcinoma Grade 3 with tumorPresent in thin-walled spaces suggestive of lymphatic spaces. HormoneReceptor status on tumor specimen revealed ER negative 0% ,AR negative 0% and Her 2 jose maria negative.She subsequently underwent rt segmental mastectomy and SLN bxOn 7/11/2014. Pathology of rt breast lumpectomy revealed invasiveDuctal carcinoma with micropapillary pattern ( Invasive micropapillaryCa) with max tumor dimension 11.5mm with suggestion of tumor in Thin walled spaces c/w lymphovascular involvement. SurgicalMargins free of tumor, grade 3, ER/AR neg , Her 2 jose maria neg With [place] Lymph node negative for Neoplasm. Pathologic staging [id number](i-)Her mother was diagnosed with breast cancer in her 50's/        Review of Systems   Constitutional: Positive for mild  fatigue. No  activity change,  fever.   HENT: Negative for mouth sores, rhinorrhea and trouble swallowing.    Eyes: Negative for visual disturbance.   Respiratory: Positive for  cough ( improved)Positive for LOGAN Negative for wheezing.    Cardiovascular: Negative for chest pain.   Gastrointestinal: Negative for abdominal pain and diarrhea.   Genitourinary: Negative for frequency.   Musculoskeletal: Negative for back pain.   Skin: Negative for rash.   Neurological: Negative for dizziness and headaches.   Hematological: Negative for adenopathy.   Psychiatric/Behavioral: The patient is not nervous/anxious.        Objective:        Vitals:    04/03/23 0942   BP: 119/69   BP Location: Left arm   Patient Position: Sitting   BP Method: Large (Automatic)   Pulse: (!) 53   SpO2: 95%   Weight: 72.7 kg (160 lb 4.4 oz)   Height: 5' 3" (1.6 m)       Vitals:    04/03/23 0942   BP: 119/69   BP Location: Left arm   Patient Position: Sitting   BP Method: Large (Automatic)   Pulse: " "(!) 53   SpO2: 95%   Weight: 72.7 kg (160 lb 4.4 oz)   Height: 5' 3" (1.6 m)           Physical Exam   Constitutional: She is oriented to person, place, and time. She appears ill, coughing episodes  HENT:   Head: Normocephalic.   Eyes: Conjunctivae and lids are normal.No scleral icterus.   Neck: Normal range of motion. Neck supple. No thyromegaly present.   Cardiovascular: Normal rate, regular rhythm and normal heart sounds.    No murmur heard.  Pulmonary/Chest: Breath sounds normal. She has no wheezes. She has no rales.   Abdominal: Soft. Bowel sounds are normal.  There is no tenderness. There is no rebound and no guarding.   Left breast- no masses or axillary LAD noted  Right breast- breast thickening inner quad    She has no cervical adenopathy.     She has rt axillary adenopathy.        Right: No supraclavicular adenopathy present.        Left: No supraclavicular adenopathy present.   Neurological: She is alert and oriented to person, place, and time. No cranial nerve deficit. Coordination normal.   Skin: Skin is warm and dry. No ecchymosis, no petechiae and no rash noted. No erythema.   Psychiatric: She has a normal mood and affect.             CT a/p w/contrast 11/29/2018   1. No acute abnormality identified within the abdomen and pelvis.    2.  There are a few nonspecific prominent lymph nodes in the upper abdomen including a periesophageal lymph node which measures 1.0 cm in short axis diameter.    3.  Significant abdominal aortic atherosclerosis and abdominal aorta ectasia.    4.  Additional findings as above.    PET/CT 1/26/2018   1.  Intense FDG uptake within the known right infrahilar lung mass, compatible with malignancy.  It is unclear if this represents primary lung malignancy or metastatic breast cancer.    2.  Intensely hypermetabolic right axillary, retropectoral, and lower right cervical lymph nodes, compatible with metastases.    3.  Abnormal FDG uptake along right breast skin thickening, new " from prior chest CTA and mammogram.  This may relate to localized edema/inflammation, though correlation with physical exam and mammography are recommended to exclude underlying inflammatory carcinoma.      MRI Brain w w/out contrast 2/9/2018  1.  No evidence of intracranial metastases.  2.  Sinus disease       Rt axillary LN bx at outside facility- on 1/16/2018 at [facility]  Pathology revealed metastatic poorly differentiated carcinoma of unknown primary  site 1/16/2018 at [facility]  TTF-1 negative, napsin negative  , cytokeratin 7 positive, cytokeratin 20 negative, p63 negative, cytokeratin 5/6 focal dim positivity    LUNG BIOPSY 1/31/2018  Pathology revealed benign lung tissue         SPECIMEN  1) Right Lung Bx 2/14/2018  Supplemental Diagnosis  Immunohistochemical stains show strong nuclear staining for p63 in essentially all tumor cells and very strong  cytoplasmic and membrane staining for CK5/6, also in essentially all tumor cells. TTF-1 and CK7 are negative  within tumor cells but do stain native pulmonary elements present within the biopsy. A stain for mucicarmine is  negative. Positive and negative controls function appropriately.  Final diagnosis: Specimen submitted as right lung biopsy  -Squamous cell carcinoma  (Electronically Signed: 2018-02-20 09:12:07 )  Diagnosed by: Phong Thompson  FINAL PATHOLOGIC DIAGNOSIS  Fragments of pulmonary parenchyma (submitted as right lung biopsy):    FINAL PATHOLOGIC DIAGNOSIS 1. Lymph node, right axilla, biopsy, review of 10 outside slides [facility], JS [phone],  collected January 11, 2018: Metastatic poorly differentiated carcinoma (see comment).  The histologic section shows fibrous stroma infiltrated by nest of poorly differentiated malignant cells including  occasional dyskeratotic cells morphologically suggestive of metastatic squamous cell carcinoma.  Immunohistochemical stains are nonspecific; the cells are  positive for cytokeratin 7 and cytokeratin 5/6 (focal) and  negative for cytokeratin 20, TTF-1, p63, GCDFP, mammoglobin, and CEA        PET/CT 2/21/2019  Increased size and irregularity of the right axillary lymph node with increased FDG avidity.  Although this would be atypical in location for lung carcinoma, the increased size and avidity must be concerning for metastatic disease in this patient with history of squamous cell lung cancer.     US Rt breast and mammo 2/26/2019  Impression:  Right  Lymph Node: Right axilla lymph node. Assessment: 4 - Suspicious finding. Biopsy is recommended.      BI-RADS Category:   Right: 4 - Suspicious  Overall: 4 - Suspicious     Recommendation:  Biopsy is recommended. Biopsy of the lymph node measuring 1.4 x 1.1 x 1.3 recommended , the one with the round shape configuration, corresponding to the most recent PET CT finding.         Pathology 3/11/2019   FINAL PATHOLOGIC DIAGNOSIS  Right axilla, mass, core biopsy:  Positive for poorly differentiated carcinoma.  See comment.  Comment:  The biopsy from the right axilla mass shows a poorly differentiated carcinoma in background lymphoid tissue  with enlarged cells showing increased nuclear size, prominent nucleoli, moderate amounts of cytoplasm and mitotic  figures. Immunostains are completed and reveal the tumor cells to stain positively with cytokeratin AE 1/AE3, CK  5/6 and CK 7. The tumor cells are negative for CK 20, Estrogen receptor, p63 and TTF-1. All stains have  satisfactory positive and negative controls. The patient's prior cases will be reviewed and an additional stain for  JUAREZ-3 is pending in an attempt to pinpoint the primary site of this malignancy and results will follow in a  supplemental report.      Supplemental Diagnosis  3/11/2019  The current axillary mass is compared to the patient's prior right lung biopsy (case number [id number]) and the  current axillary mass is morphologically similar to the lung malignancy.  Also, the current axillary mass is compared  to the patient's prior breast resection (Case number [id number]) and appears similar as well. P63 is negative in the  [id number] tumor and CK 5/6 shows scattered positive staining in the [id number] tumor.  Immunostain for JUAREZ-3 is completed and shows only rare weak to moderate staining within the tumor cell nuclei  with satisfactory positive and negative controls. This stain is positive in the surrounding lymphocytes. This staining  pattern is non-specific and does not definitively differentiate between a lung and breast primary malignancy in this  right axilla mass biopsy. The staining profile of the current axillary mass match more closely with the previous  breast carcinoma (due to the p63 negativity in both the 2014 breast cancer and the current axillary mass lesion but  p63 positivity in the lung mass from 2018).  This staining profile, together with the comparison with the patient's prior breast tumor and prior lung tumor supports  a diagnosis of poorly differentiated carcinoma and the malignancy appears morphologically similar to the prior  malignancies from both sites, but the staining profile in this small sample from the axillary mass more closely  correlates with the prior breast malignancy. Pathologic subclassification of this malignancy's primary location is not  definitive and clinical correlation is recommended definitively decide on possible primary location in this patient's  axillary mass sample.  Supplemental (2):  Additional immunohistochemical staining for progesterone receptor and HER2 are completed per clinician request  with following results:  Progesterone receptor: Negative; 0% nuclear tumor cell staining.  HER2: Negative; stain score = 0.  Supplemental (3):  [place] DIAGNOSIS:  FINAL DIAGNOSIS  Breast, right, needle core biopsy ([id number]; 06/17/2014): Invasive ductal carcinoma, [place] grade III (of  III), with focal micropapillary  features.  Immunohistochemical stains performed at the referring institution show that the tumor cells have the following  phenotype: - Estrogen receptor: Negative (0% tumor cells staining). - Progesterone receptor: Negative (0% tumor  cells staining). - HER2: Negative (score 0).  Lymph nodes, right, sentinel biopsy and lumpectomy ([id number]; 07/11/2014):  1. Right sentinel lymph node: A single (1) lymph node is negative for metastatic carcinoma.  Immunohistochemical stains performed by the referring institution and reviewed at [facility] for cytokeratin are  negative, confirming the diagnosis.  2. Right breast: Invasive ductal carcinoma with micropapillary features, per report 11.5 mm in greatest dimension.  Foci suspicious for lymphovascular invasion identified. Margins of resection are free of tumor.  Immunohistochemical stains performed by the referring institution and reviewed at [facility] show that the tumor  cells are negative for p63 and show patchy positivity for CK 5/6.  Lung, right, needle core biopsy ([id number]; 02/14/2018): Squamous cell carcinoma.    Immunohistochemical stains performed by the referring institution show the tumor cells are strongly positive for p63  and CK 5/6, while negative for TTF-1 and CK7. These result support the diagnosis. Axilla, right, needle core biopsy  ([id number]; 03/11/2019): Lymph node positive for metastatic carcinoma, most consistent with breast primary  (see comment).  Immunohistochemical stains performed by the referring institution and reviewed at [facility] show that the tumor  cells are negative for p63, focally positive for CK 5/6, focally and weakly positive for JUAREZ-3, and strongly positive  for CK7. They are also negative for estrogen receptor, progesterone receptor, and HER2 JAM (score 0).  COMMENT:  Thank you for allowing me to review the case of this 71-year-old lady who recently underwent needle core biopsies  of a right axillary mass and who has a  previous diagnosis of an invasive ductal carcinoma of the right breast, as well  as a squamous cell carcinoma of the lung. I am reviewing this case because of my special interest in breast  pathology.  I agree with your interpretation of this case. In my opinion, the lung tumor corresponds to a squamous cell  carcinoma and appears to be unrelated to the invasive ductal carcinoma of the breast. The axillary mass, in my  opinion, corresponds to metastases of the breast primary. Although it is a poorly differentiated carcinoma, the  immunophenotype is most consistent with the one that the breast primary exhibited, and is inconsistent with a  metastatic squamous cell carcinoma. I did share the case with Dr. Anabel Stone, one of our pulmonary pathologists,  and she concurs with this interpretation.  Note: Report attached.  Performing location:  [place] [address]      LYMPH NODE, RIGHT AXILLA, BIOPSY: 6/16/21   - Metastatic poorly-differentiated carcinoma, consistent with breast primary  -ER, [place], and HER2 immunohistochemical hormonal markers are also negative  PD-L1 (22C3) SemiQuant IHC, Manual  Interpretation  Right axillary lymph node , specimen for PD-L1 immunohistochemistry studies (clone 22C3, [employer], [place], [place]; using a proprietary detection system) ([id number]):  Reported carcinoma site of origin: Breast  Result: The percent of PD-L1 positive cells based upon the total number of tumor cells (combined positive  score, CPS) is greater than or equal to 10.  Interpretation: Studies suggest that positive PD-L1 immunohistochemistry in tumor cells and/or tumorassociated  immune cells may predict tumor response to therapy with immune checkpoint inhibitors. This  result should not be used as the sole factor in determining treatment, as other factors (for example, tumor  mutation burden, and microsatellite instability) have been also  studied as predictive markers.          CT neck/chest/abd/pelvis w/contrast 4/26/2019   The previously described right hilar mass is no longer visible.  There is persistent volume loss in the right middle lobe this appears similar to the prior PET-CT February 21, 2019.    Persistent abnormal right axillary node today measuring about 15 mm compared 18 mm prior.  The positioning of the adjacent nodes is slightly different likely accounting for the slight difference in measurement.    Cluster of tree-in-bud nodular densities left lower lobe series 2, image 93.  This may be related to small airways disease though serial follow-up suggested.      PET/CT 6/20/2019   New and worsening hypermetabolic lymph nodes concerning for metastatic breast cancer as described above.  Tissue sampling would be required for definitive diagnosis.      2-D echo 6/27/2019   Normal left ventricular systolic function. The estimated ejection fraction is 55%  Concentric left ventricular remodeling.  Normal LV diastolic function.  Normal right ventricular systolic function.  Moderate left atrial enlargement.  Mild right atrial enlargement.  Mild aortic regurgitation.  Mild mitral regurgitation.  Mild tricuspid regurgitation.  Normal central venous pressure (3 mm Hg).  The estimated PA systolic pressure is 25 mm Hg      PET/CT 9/19/2019   Favorable response to therapy with interval decrease in size and uptake of several right axillary lymph nodes and a supraclavicular lymph node.  Mild residual activity may indicate viable tumor.    No new hypermetabolic findings worrisome for malignancy.    PET/CT 2/28/2020  1.  No evidence of hypermetabolic tumor.  Continued improvement of the right axilla status post adjuvant radiation.    2.  No new hypermetabolic lesions      CTA 6/17/2020  1. No evidence of pulmonary embolus. No aortic dissection.   2. There is no evidence for new or progressive disease in the chest as compared to PET/CT 6/20/2019 in this  patient with history of right breast cancer and right lung neoplasm. The patient does have a more recent PET/CT 2/28/2020 from an outside facility per the electronic medical record although images are not available for direct comparison. Correlation with these images may be beneficial. Continued long-term surveillance recommended.  3. Stable appearance of the treated tumor in the right hilum/middle lobe.  4. Stable treated right breast cancer and axillary adenopathy without evidence for developing breast mass or new right axillary adenopathy.  5. Stable tiny nodularity/tree-in-bud densities in the left lung, probably postinfectious or inflammatory.  6. Wall thickening of the esophagus may be correlated for esophagitis. Possible tiny hiatus hernia.  7. Slight bronchial wall thickening may be correlated for bronchitis.  8. Mild to moderate emphysema as before.  9. Reflux of contrast into the IVC and hepatic veins is nonspecific but may be correlated for elevated right heart pressures.  10. Coronary calcifications and/or stents similar to prior.    Echo 5/21/2020  Technically difficult study.   Normal left ventricular systolic function.   Left ventricular ejection fraction is estimated at 55%.   Severe mitral annular calcification.   Mild mitral valve regurgitation.   Aortic valve sclerosis without stenosis or regurgitation.     PFTs 6/17/2020  Spirometry reveals a very severe obstructive lung defect, which does not significantly improve post bronchodilators. Lung volumes revealed air trapping. Diffusing capacity was moderately impaired.        Del 6/26/2020  BI-RADS Category:   Overall: 2 - Benign      PET/CT 10/23/2020   Impression:     Stable mildly hypermetabolic right axillary lymph node/nodular density contralateral to the site of injection.  Differential considerations include metastatic lymph node, reactive lymph node from benign right upper extremity process, and fat necrosis.  Recommend physical examination  of the upper extremity and consideration of ultrasound for further evaluation of the node/nodule and possible image guided biopsy.  Otherwise, attention on follow-up.     Otherwise, no other hypermetabolic disease identified      Mammo diagnostic right /US 11/4/2020   Impression:   1. No suspicious finding either on mammogram or ultrasound at the site of the palpable lump in the right breast at 10:00 o'clock. A bilateral mammogram is recommended in June 2020 to return to the patient to annual screening.   2. Redemonstration of the morphologically abnormal lymph node in the right axilla that contains a biopsy clip, compatible with the known recurrence metastatic breast cancer that has been treated with chemotherapy. The appearance of this lymph node is unchanged from 06/26/2020 and overall appears smaller when compared to 03/11/2019.     Abd US 12/11/2020   Impression:     1. Echogenic liver likely representing hepatic steatosis.  2. Right upper quadrant ultrasound otherwise is unremarkable.     PET/CT 10/14/21     Impression:     1.  No definite evidence of hypermetabolic tumor.  Interval resolution of hypermetabolic right axillary lymph nodes.  No new hypermetabolic findings.     2.  Persistent low-grade diffuse cutaneous uptake which is nonspecific.    MRI shoulder w w/out contrast 1/26/22   Impression:     Mild edema and postcontrast enhancement at the myotendinous junction of the supraspinatus and infraspinatus, it is nonspecific and could be due to degenerative change or tendinosis.     Small area of interstitial tear at the footprint insertion of the infraspinatus tendon.     No large rotator cuff tear.     No advanced degenerative change.     No osseous lesions.     PET/CT 1/26/22  Impression:In this patient with breast cancer, there is no evidence of hypermetabolic tumor to suggest recurrent or metastatic disease.   Less extensive but, nonspecific, low-grade diffuse cutaneous uptake of the right breast.        PET/CT 4/27/22  Impression:     1.  No FDG avid disease to suggest recurrence or metastatic disease.     Unchanged right breast skin thickening with mild FDG uptake.       PET/CT 7/13/22   Impression:     In this patient with lung and breast cancer, there is new left lower lobe opacification with mild radiotracer uptake which may represent aspiration, pneumonia, or malignancy.  Tissue sampling would be required for definitive diagnosis.     Unchanged right breast skin thickening with mild radiotracer uptake.    CT chest w/contrast 8/25/22    Decreasing patchy pulmonary consolidation within the left lower lobe when compared to prior PET-CT scan dated 07/13/2022.  The overall appearance of the patchy consolidation as well as the moderate improvement when compared to the prior study suggest a benign etiology such as left lower lobe pneumonia.     Persistent band of atelectasis/scarring within the right middle lobe, stable dating back to 04/26/2019.     Coronary artery atherosclerosis in a multi vessel distribution.     There are no measurable lesions per RECIST criteria.    PET /CT  11/21/22 shows New right lower lobe infiltrate worrisome for pneumonia or aspiration.Chronic infiltrate right middle lobe.   Resolution of the left lower lobe infiltrate.    Mammo 7/26/22  BI-RADS Category:   Overall: 2 - Benign        CT chest with contrast 11/21/22    Impression:     New right lower lobe infiltrate worrisome for pneumonia or aspiration.     Chronic infiltrate right middle lobe.     Resolution of the left lower lobe infiltrate.     Coronary artery calcifications.         PET/CT 1/11/23  Impression:     1. In this patient with lung and breast cancer, there is stable right breast skin thickening with mild radiotracer uptake.  2. Interval resolution of hypermetabolic opacification in the left lower lobe as compared to FDG PET-CT 07/14/2022.  Interval improvement in patchy opacities in the right lower lobe as compared to  CT 11/21/2022    .    Assessment:       1. Recurrent breast cancer, unspecified laterality    2. History of lung cancer    3. Chronic obstructive pulmonary disease, unspecified COPD type    4. Abnormal thyroid function test                Plan:     1.,2.   Pt with hx of Stage 1A invasive ductal carcinoma rt breast s/p rt segmental mastectomy and SLN bx 7/11/2014 ER/ND neg HER 2 jose maria neg [id number](i-).  S/p adjuvant RT completed 9/2014 Pt declined adjuvant chemo  Pt  hospitalized 11/2017 with acute-on-chronic  resp failure  Abnormal CT imaging revealing Large right hilar mass with involvement of  adjacent segmental pulmonary arterial branches and bronchi with associated postobstructive atelectasis  and involvement of mediastinal and axillary adenopathy   S/p rt axillary LN bx ( outside facility) - metastatic poorly differentiated carcinoma of unknown primary  site  status post  lung biopsy 1/31/2017 -benign lung tissue  PET/CT 1/26/2018   Intense FDG uptake within the known right infrahilar lung mass, compatible with malignancy.   Intensely hypermetabolic right axillary, retropectoral, and lower right cervical lymph nodes, compatible with metastases.  Abnormal FDG uptake along right breast skin thickening, new from prior chest CTA and mammogram.    Repeat lung bx 2/14/2018 revealed Squamous Cell CA lung PD-L1 10% EGFR NEGALK NEG  Pt treated for advanced squamous cell CA of lung She s/p  cycle 6 of carboplatin/Abraxane Day1 completed 7/2/2018 ( day 15 held due to prolonged cytopenias)  She completed therapy with near CR     PET/CT 2/21/2019 showed increased size and irregularity of the right axillary lymph node with increased FDG avidity     MAMMO/US rt breast 3/2019  reveals suspicious findings rt axilla LN.  Biopsy of the lymph node measuring 1.4 x 1.1 x 1.3     Pt s/p  rt axillary LN bxPositive for poorly differentiated carcinoma.- likely breast primary ERneg PRneg Her 2 neg    Outside review of specimen(s) revealed   lung tumor corresponds to a squamous cell carcinoma and appears to be unrelated to the invasive ductal carcinoma of the breast. It was determined The axillary mass, in my opinion, corresponded  to metastases of the breast primary. Although it is a poorly differentiated carcinoma, theimmunophenotype is most consistent with the one that the breast primary exhibited, and is inconsistent with a metastatic squamous cell carcinoma.     CT imaging 4/26/2019 persistent rt axillary LAD, no other sites of disease     Lymph node biopsy 3/11/2019 Right axilla, mass, core biopsy:Positive for poorly differentiated carcinoma.favor breast primary     PET/CT 6/20/2019  New and worsening hypermetabolic lymph nodes concerning for metastatic breast cancer     Previously Discussed  imaging findings in detail with patient which reveals new and worsening hypermetabolic melena metabolic lymph nodes including hypermetabolic supraclavicular node.  Therefore, at treatment option will be systemic chemotherapy in light of the recent imaging findings.  She will be followed by her surgical oncologist Dr. Christopher      IT was determined to proceed with systemic chemotherapy   S/p  AC [id number] x 3 wks x 4 wks completed 9/6/2019   ( pt has not received in past)      PET/CT 9/19/2019 shows disease response with interval decrease in size and uptake of several right axillary lymph nodes and a supraclavicular lymph node.  Mild residual activity may indicate viable tumor.No new hypermetabolic findings worrisome for malignancy.    Pt followed by  Breast Surgery and plan was  for possible   ALND. Pt with Recurrent cancer in her right axilla and right supraclavicular fossa.   She completed chemotherapy 9/6/2019 with near CR  It was determined  Radiation will be given to the original areas of hypermetabolic activity in the right axilla and right supraclavicular region    Pt completed  RT 12/16/2019     PET/CT 1/14/21 shows   New right axillary hypermetabolic  lymph nodes with preserved normal architecture suggestive of reactive nodes.  Stable appearing previously identified hypermetabolic lymph node with mild increase in surrounding fat stranding.        Findings previously d/w with treating breast surgeon and it was determined to cont to monitor and close f/u as pt is heavily pre treated, has received RT to site   Pt acknowledged understanding and agreeable with plan     PET/CT imaging  5/20/21 shows Continued increase in both size and hypermetabolic activity of right axillary level 1 lymph nodes a new, mildly hypermetabolic cutaneous thickening of the right breast.     Right breast, skin, punch biopsy:Negative for carcinoma    LYMPH NODE, RIGHT AXILLA, BIOPSY: 6/16/21   - Metastatic poorly-differentiated carcinoma, consistent with breast primary triple neg  PD-L1 immunohistochemistry studies(combined positive score, CPS) is greater than or equal to 10   PET/CT showed  No definite evidence of hypermetabolic tumor.  Interval resolution of hypermetabolic right axillary lymph nodes.  No new hypermetabolic findings.      S/p C16 pembrolizumab 6/15/22   Last  PET/CT imaging shows  new left lower lobe opacification with mild radiotracer uptake which may represent aspiration, pneumonia, or malignancy.  Recent follow-up imaging studies decreasing patchy pulmonary consolidation within the left lower lobe when compared to prior PET-CT scan dated 07/13/2022.  Plan to remain off of therapy   Follow-up PET/CT imaging 1/11/23 Interval resolution of hypermetabolic opacification in the left lower lobe as compared to FDG PET-CT 07/14/2022.  Interval improvement in patchy opacities in the right lower lobe as compared to CT 11/21/2022  Plan follow up PET imaging - pt requests to be performed at [facility] .[facility] mobile unit not available at  since broken     3. F/b Pulmonology  Pt on supp 02 at night   Cont MDI and O2 as prescribed     4. Check TSH    Cbc,cmp, Mag,  prior to follow-up  2mos  PET prior to f/u   PAC FLUSH today     Advance Care Planning     Power of   I previously  initiated the process of advance care planning today and explained the importance of this process to the patient.  I introduced the concept of advance directives to the patient, as well. Then the patient received detailed information about the importance of designating a Health Care Power of  (HCPOA). She was also instructed to communicate with this person about their wishes for future healthcare, should she become sick and lose decision-making capacity. The patient has previously appointed a HCPOA. After our discussion, the patient has decided to complete a HCPOA and has appointed her son and niece and  I spent a total time of 16 minutes discussing this issue with the patient.         Cc: Swetha Katz M.D.         MD Ranjan Roberson MD

## 2024-01-26 ENCOUNTER — TELEPHONE (OUTPATIENT)
Dept: FAMILY MEDICINE CLINIC | Facility: CLINIC | Age: 54
End: 2024-01-26

## 2024-01-26 DIAGNOSIS — Z12.5 SPECIAL SCREENING FOR MALIGNANT NEOPLASM OF PROSTATE: ICD-10-CM

## 2024-01-26 DIAGNOSIS — Z00.00 LABORATORY EXAMINATION ORDERED AS PART OF A ROUTINE GENERAL MEDICAL EXAMINATION: Primary | ICD-10-CM

## 2024-01-26 NOTE — TELEPHONE ENCOUNTER
Future Appointments   Date Time Provider Department Center   3/27/2024 12:40 PM Chris Chavez MD [facility] [address] [facility] [address]     Patient requesting lab orders, will like to do prior to visit. Patient will like to include PSA and testosterone orders.

## 2024-03-25 ENCOUNTER — PATIENT MESSAGE (OUTPATIENT)
Dept: FAMILY MEDICINE CLINIC | Facility: CLINIC | Age: 54
End: 2024-03-25

## 2024-03-25 NOTE — TELEPHONE ENCOUNTER
From: Ed Shelley Jr.  To: Chris Chavez  Sent: 3/25/2024 12:21 PM CDT  Subject: Blood work for annual physical    Could you please put in an order for me to get blood work done prior to my annual physical? I would like all tests run that were run prior to my last physical, including PSA. I would plan to use the lab at the [address] [place] [address], if that's OK. Thank you

## 2024-03-31 LAB
ABSOLUTE BASOPHILS: 90 CELLS/UL (ref 0–200)
ABSOLUTE EOSINOPHILS: 222 CELLS/UL (ref 15–500)
ABSOLUTE LYMPHOCYTES: 1404 CELLS/UL (ref 850–3900)
ABSOLUTE MONOCYTES: 732 CELLS/UL (ref 200–950)
ABSOLUTE NEUTROPHILS: 3552 CELLS/UL (ref 1500–7800)
ALBUMIN/GLOBULIN RATIO: 1.8 (CALC) (ref 1–2.5)
ALBUMIN: 4.1 G/DL (ref 3.6–5.1)
ALBUMIN: 4.2 G/DL (ref 3.6–5.1)
ALKALINE PHOSPHATASE: 88 U/L (ref 35–144)
ALT: 23 U/L (ref 9–46)
AST: 18 U/L (ref 10–35)
BASOPHILS: 1.5 %
BILIRUBIN, TOTAL: 0.7 MG/DL (ref 0.2–1.2)
BUN: 13 MG/DL (ref 7–25)
CALCIUM: 9.2 MG/DL (ref 8.6–10.3)
CARBON DIOXIDE: 31 MMOL/L (ref 20–32)
CHLORIDE: 103 MMOL/L (ref 98–110)
CHOL/HDLC RATIO: 3.1 (CALC)
CHOLESTEROL, TOTAL: 181 MG/DL
CREATININE: 0.87 MG/DL (ref 0.7–1.3)
EGFR: 102 ML/MIN/1.73M2
EOSINOPHILS: 3.7 %
GLOBULIN: 2.4 G/DL (CALC) (ref 1.9–3.7)
GLUCOSE: 91 MG/DL (ref 65–99)
HDL CHOLESTEROL: 58 MG/DL
HEMATOCRIT: 44.9 % (ref 38.5–50)
HEMOGLOBIN: 14.5 G/DL (ref 13.2–17.1)
LDL-CHOLESTEROL: 103 MG/DL (CALC)
LYMPHOCYTES: 23.4 %
MCH: 31.4 PG (ref 27–33)
MCHC: 32.3 G/DL (ref 32–36)
MCV: 97.2 FL (ref 80–100)
MONOCYTES: 12.2 %
MPV: 11.2 FL (ref 7.5–12.5)
NEUTROPHILS: 59.2 %
NON-HDL CHOLESTEROL: 123 MG/DL (CALC)
PLATELET COUNT: 274 THOUSAND/UL (ref 140–400)
POTASSIUM: 4.5 MMOL/L (ref 3.5–5.3)
PROTEIN, TOTAL: 6.6 G/DL (ref 6.1–8.1)
PSA, TOTAL: 1.26 NG/ML
RDW: 12.8 % (ref 11–15)
RED BLOOD CELL COUNT: 4.62 MILLION/UL (ref 4.2–5.8)
SEX HORMONE BINDING$GLOBULIN: 31.1 NMOL/L (ref 10–50)
SODIUM: 139 MMOL/L (ref 135–146)
T4, FREE: 1.1 NG/DL (ref 0.8–1.8)
TESTOSTERONE, FREE: 72.7 PG/ML (ref 46–224)
TESTOSTERONE, TOTAL,$/MS/MS: 498 NG/DL (ref 250–1100)
TESTOSTERONE,BIOAVAILABLE: 136.9 NG/DL (ref 110–575)
TRIGLYCERIDES: 105 MG/DL
TSH: 1.75 MIU/L (ref 0.4–4.5)
WHITE BLOOD CELL COUNT: 6 THOUSAND/UL (ref 3.8–10.8)

## 2024-04-01 ENCOUNTER — MED REC SCAN ONLY (OUTPATIENT)
Dept: FAMILY MEDICINE CLINIC | Facility: CLINIC | Age: 54
End: 2024-04-01

## 2024-05-01 ENCOUNTER — OFFICE VISIT (OUTPATIENT)
Dept: FAMILY MEDICINE CLINIC | Facility: CLINIC | Age: 54
End: 2024-05-01
Payer: COMMERCIAL

## 2024-05-01 VITALS
OXYGEN SATURATION: 95 % | TEMPERATURE: 98 F | RESPIRATION RATE: 14 BRPM | HEIGHT: 72 IN | SYSTOLIC BLOOD PRESSURE: 122 MMHG | WEIGHT: 205 LBS | HEART RATE: 60 BPM | DIASTOLIC BLOOD PRESSURE: 92 MMHG | BODY MASS INDEX: 27.77 KG/M2

## 2024-05-01 DIAGNOSIS — Z80.41 FAMILY HISTORY OF OVARIAN CANCER: ICD-10-CM

## 2024-05-01 DIAGNOSIS — R19.7 DIARRHEA, UNSPECIFIED TYPE: ICD-10-CM

## 2024-05-01 DIAGNOSIS — Z23 NEED FOR VACCINATION: ICD-10-CM

## 2024-05-01 DIAGNOSIS — Z23 NEED FOR SHINGLES VACCINE: ICD-10-CM

## 2024-05-01 DIAGNOSIS — Z84.81 FAMILY HISTORY OF GENE MUTATION: Primary | ICD-10-CM

## 2024-05-01 DIAGNOSIS — Z80.42 FAMILY HISTORY OF PROSTATE CANCER: ICD-10-CM

## 2024-05-01 DIAGNOSIS — Z80.0 FAMILY HISTORY OF COLON CANCER: ICD-10-CM

## 2024-05-01 PROCEDURE — 90750 HZV VACC RECOMBINANT IM: CPT | Performed by: FAMILY MEDICINE

## 2024-05-01 PROCEDURE — 99396 PREV VISIT EST AGE 40-64: CPT | Performed by: FAMILY MEDICINE

## 2024-05-01 PROCEDURE — 90471 IMMUNIZATION ADMIN: CPT | Performed by: FAMILY MEDICINE

## 2024-05-01 NOTE — PROGRESS NOTES
Ed Shelley Jr. is a 54 year old male who presents for a complete physical exam.   HPI:   Patient has 2 small concerns that he would like to discuss with me otherwise he is doing fine.    The first concern is that for many years he has not had solid stool he states.  He denies having any blood in the stool he denies any weight loss.  He denies any pus or mucus in the stool.  He was wondering if he should see a GI doctor.    The second concern is that there is a family history of YVONNE gene mutation and on the father side there is family history of prostate, ovarian and colon cancer.  He was wondering if he should see a .      Urination changes no  ED symptoms no  Immunizations needed no  Wt Readings from Last 6 Encounters:   05/01/24 205 lb (93 kg)   12/08/21 198 lb 6 oz (90 kg)   02/26/21 194 lb 14.2 oz (88.4 kg)   03/02/20 201 lb (91.2 kg)   01/31/20 197 lb 14.4 oz (89.8 kg)   12/04/19 196 lb (88.9 kg)     Body mass index is 27.8 kg/m².     Results for orders placed or performed in visit on 01/26/24   CBC [6399] [Q]   Result Value Ref Range    WHITE BLOOD CELL COUNT 6.0 3.8 - 10.8 Thousand/uL    RED BLOOD CELL COUNT 4.62 4.20 - 5.80 Million/uL    HEMOGLOBIN 14.5 13.2 - 17.1 g/dL    HEMATOCRIT 44.9 38.5 - 50.0 %    MCV 97.2 80.0 - 100.0 fL    MCH 31.4 27.0 - 33.0 pg    MCHC 32.3 32.0 - 36.0 g/dL    RDW 12.8 11.0 - 15.0 %    PLATELET COUNT 274 140 - 400 Thousand/uL    MPV 11.2 7.5 - 12.5 fL    ABSOLUTE NEUTROPHILS 3,552 1,500 - 7,800 cells/uL    ABSOLUTE LYMPHOCYTES 1,404 850 - 3,900 cells/uL    ABSOLUTE MONOCYTES 732 200 - 950 cells/uL    ABSOLUTE EOSINOPHILS 222 15 - 500 cells/uL    ABSOLUTE BASOPHILS 90 0 - 200 cells/uL    NEUTROPHILS 59.2 %    LYMPHOCYTES 23.4 %    MONOCYTES 12.2 %    EOSINOPHILS 3.7 %    BASOPHILS 1.5 %    COMMENT(S)     PSA - Total [5363][Q]   Result Value Ref Range    PSA, TOTAL 1.26 < OR = 4.00 ng/mL   TSH [899] [Q]   Result Value Ref Range    TSH 1.75 0.40 - 4.50 mIU/L    T4 FREE [866] [Q]   Result Value Ref Range    T4, FREE 1.1 0.8 - 1.8 ng/dL   LIPID PANEL [7600] [Q]   Result Value Ref Range    CHOLESTEROL, TOTAL 181 <200 mg/dL    HDL CHOLESTEROL 58 > OR = 40 mg/dL    TRIGLYCERIDES 105 <150 mg/dL    LDL-CHOLESTEROL 103 (H) mg/dL (calc)    CHOL/HDLC RATIO 3.1 <5.0 (calc)    NON-HDL CHOLESTEROL 123 <130 mg/dL (calc)   Comp Metabolic Panel (14)   Result Value Ref Range    GLUCOSE 91 65 - 99 mg/dL    UREA NITROGEN (BUN) 13 7 - 25 mg/dL    CREATININE 0.87 0.70 - 1.30 mg/dL    EGFR 102 > OR = 60 mL/min/1.73m2    BUN/CREATININE RATIO SEE NOTE: 6 - 22 (calc)    SODIUM 139 135 - 146 mmol/L    POTASSIUM 4.5 3.5 - 5.3 mmol/L    CHLORIDE 103 98 - 110 mmol/L    CARBON DIOXIDE 31 20 - 32 mmol/L    CALCIUM 9.2 8.6 - 10.3 mg/dL    PROTEIN, TOTAL 6.6 6.1 - 8.1 g/dL    ALBUMIN 4.2 3.6 - 5.1 g/dL    GLOBULIN 2.4 1.9 - 3.7 g/dL (calc)    ALBUMIN/GLOBULIN RATIO 1.8 1.0 - 2.5 (calc)    BILIRUBIN, TOTAL 0.7 0.2 - 1.2 mg/dL    ALKALINE PHOSPHATASE 88 35 - 144 U/L    AST 18 10 - 35 U/L    ALT 23 9 - 46 U/L   TESTOSTERONE (FREE) [45786] [Q]   Result Value Ref Range    ALBUMIN 4.1 3.6 - 5.1 g/dL    SEX HORMONE BINDING$GLOBULIN 31.1 10 - 50 nmol/L    TESTOSTERONE, FREE 72.7 46.0 - 224.0 pg/mL    TESTOSTERONE,BIOAVAILABLE 136.9 110.0 - 575.0 ng/dL    TESTOSTERONE, TOTAL,$LC/MS/ 250 - 1,100 ng/dL       No current outpatient medications on file.      Past Medical History:    B12 deficiency    Basal cell carcinoma    back removed 2015    Calculus of kidney    Dysplastic nevi    Multiple moles- Dr Barber    Pulmonary embolism (HCC)    1999    Thyroid nodule    Visual impairment    reading glasses      Past Surgical History:   Procedure Laterality Date    Colonoscopy  12/2021    tics; repeat 10 yrs    Other surgical history  2000    intermedulary nail-  rt tibia     Other surgical history  02/2021    left rotator cuff repair    Repair rotator cuff,acute  2007    Skin surgery      basal cell ca removed  2015      Family History   Problem Relation Age of Onset    Cancer Father         prostate age 66     Hypertension Father     Diabetes Maternal Grandfather     Heart Disorder Paternal Grandmother     Cancer Paternal Grandfather         lung     Other (prostate cancer) Other         uncle      Social History:  Social History     Socioeconomic History    Marital status:    Tobacco Use    Smoking status: Never    Smokeless tobacco: Never   Vaping Use    Vaping status: Never Used   Substance and Sexual Activity    Alcohol use: Yes    Drug use: No   Other Topics Concern     Service No    Blood Transfusions No    Caffeine Concern Yes     Comment: diet pepsi    Occupational Exposure No    Hobby Hazards No    Sleep Concern No    Stress Concern No    Weight Concern No    Special Diet No    Back Care No    Exercise Yes    Bike Helmet Yes    Seat Belt Yes    Self-Exams No         REVIEW OF SYSTEMS:   GENERAL: feels well overall, denies fever or chills, denies change in weight  SKIN: denies any unusual skin lesions  EYES: denies changes in vision  HENT: denies upper respiratory symptoms  LUNGS: denies QING, wheezing, cough, orthopnea and PND  CARDIOVASCULAR: denies CP, palpitations, rapid or slow heart rate. Denies BERMUDEZ/lower extremity swelling  GI: denies abdominal pain,denies heartburn, denies n/v/c/d/change in stools/blood in stool/black stool/change in appetite  : denies nocturia or changes in urinary stream, denies scrotal mass/abnormal discharge from urethra  MUSCULOSKELETAL: denies joint or muscle aches or pains  NEURO: denies headaches/dizziness  PSYCH: denies depression or anxiety  HEMATOLOGIC: denies easy bruising/bleeding/anemia/blood clot disorders  ENDOCRINE: denies weight gain/weight loss/enlargement of neck glands/polyuria/polydypsia  ALL/ASTHMA: denies allergic rhinitis/asthma    EXAM:   BP (!) 142/92   Pulse 60   Temp 98.3 °F (36.8 °C) (Temporal)   Resp 14   Ht 6' (1.829 m)   Wt 205 lb (93  kg)   SpO2 95%   BMI 27.80 kg/m²   Body mass index is 27.8 kg/m².   GENERAL: NAD, pleasant, well developed, normal voice  SKIN: no rashes, no suspicious moles or lesions  HENT: NCAT, EACs clear b/l, TMs normal b/l, nasal turbinates normal b/l, oropharynx with mmm/clear/normal, gingiva normal, lips normal  EYES: PERRLA, EOMI, conjunctiva non-injected and non-icteric  NECK: supple, no adenopathy/thyromegaly/thyroid nodules/masses  CHEST: no chest tenderness  LUNGS: CTA A/P, no wheezes/ronchi/rales/crackles, normal air excursion  CARDIOVASCULAR: RRR, no murmur, no lower extremity edema, pedal and femoral pulses 2+ and symmetric b/l  GI: normoactive BS, non-distended, non-tender to palpation, no HSM/masses/pulsations  MUSCULOSKELETAL: Back with normal AROM, no joint swelling, extremities x 4 with normal strength 5/5 and symmetric and with normal AROM/PROM.   EXTREMITIES: no cyanosis, clubbing or edema  NEURO: A&O x3, CN II-XII grossly intact. Reflexes: biceps and patellar 2+ b/l and symmetric. Gait is normal.  PSYCH: normal affect, no apparent thought disorder, average judgement and insight    Immunization History   Administered Date(s) Administered    >=3 YRS TRI  MULTIDOSE VIAL (46406) FLU CLINIC 11/11/2014    >=9 YRS AFLURIA TRI PRESERV FREE SINGLE DOSE (86434) FLU CLINIC 11/03/2015    FLULAVAL 6 months & older 0.5 ml Prefilled syringe (54008) 12/04/2017, 12/08/2021    FLUZONE 6 months and older PFS 0.5 ml (81229) 12/12/2016    Influenza 10/01/2018    Zoster Vaccine Recombinant Adjuvanted (Shingrix) 12/08/2021   Pended Date(s) Pended    FLULAVAL 6 months & older 0.5 ml Prefilled syringe (10285) 12/04/2019       ASSESSMENT AND PLAN:   Ed Ibrahim Daphney Barcenas is a 54 year old male who presents for a complete physical exam.   Ed was seen today for physical and immunization/injection.    Diagnoses and all orders for this visit:    Family history of gene mutation  -     Genetic Counselor Referral - [facility]  ([facility])    Diarrhea, unspecified type  -     Gastro Referral - In Network    Family history of prostate cancer  -     Genetic Counselor Referral - [facility] ([facility])    Family history of colon cancer  -     Genetic Counselor Referral - [facility] ([facility])    Family history of ovarian cancer  -     Genetic Counselor Referral - [facility] ([facility]ga)    Need for shingles vaccine  -     Immunization Admin Counseling, 1st Component, 18 years and older    Need for vaccination  -     Shingrix (Shingles recombinant) Immunization (50 and older)      I strongly recommend seeing a GI doctor as well as a .   Will refer.       There are no diagnoses linked to this encounter.     Pt educated on immunizations and health maintenance appropriate for age.     The patient verbalizes understanding of these recommendations and agrees to the plan.    The patient is asked to return for complete physical yearly.    There are no Patient Instructions on file for this visit.  No orders of the defined types were placed in this encounter.

## 2025-02-28 ENCOUNTER — OFFICE VISIT (OUTPATIENT)
Dept: FAMILY MEDICINE CLINIC | Facility: CLINIC | Age: 55
End: 2025-02-28
Payer: COMMERCIAL

## 2025-02-28 VITALS
SYSTOLIC BLOOD PRESSURE: 122 MMHG | OXYGEN SATURATION: 98 % | DIASTOLIC BLOOD PRESSURE: 78 MMHG | BODY MASS INDEX: 26 KG/M2 | HEART RATE: 73 BPM | WEIGHT: 195 LBS | RESPIRATION RATE: 16 BRPM | TEMPERATURE: 99 F

## 2025-02-28 DIAGNOSIS — R09.81 SINUS CONGESTION: Primary | ICD-10-CM

## 2025-02-28 DIAGNOSIS — R05.1 ACUTE COUGH: ICD-10-CM

## 2025-02-28 PROCEDURE — 99213 OFFICE O/P EST LOW 20 MIN: CPT | Performed by: NURSE PRACTITIONER

## 2025-02-28 NOTE — PROGRESS NOTES
CHIEF COMPLAINT:     Chief Complaint   Patient presents with    Cough     S/s for 2 weeks, OTC meds taken., wet cough, nasal congestion and sore throat.         HPI:   Ed Shelley Jr. is a 55 year old male who presents for sinus congestion for  2  weeks. Symptoms have been worsening since onset. Sinus congestion/pain is described as a pressure and is located mainly front of face.  Reports thick nasal discharge. Has treated symptoms with daytime/nighttime cold medication.  Patient also reports headache, cough, fullness in ears.  Denies fever, chills, dental pain, tinnitus, N/V/D.        Current Outpatient Medications   Medication Sig Dispense Refill    amoxicillin clavulanate 875-125 MG Oral Tab Take 1 tablet by mouth 2 (two) times daily for 7 days. 14 tablet 0      Past Medical History:    B12 deficiency    Basal cell carcinoma    back removed 2015    Calculus of kidney    Dysplastic nevi    Multiple moles- Dr Barber    Pulmonary embolism (HCC)    1999    Thyroid nodule    Visual impairment    reading glasses      Past Surgical History:   Procedure Laterality Date    Colonoscopy  12/2021    tics; repeat 10 yrs    Other surgical history  2000    intermedulary nail-  rt tibia     Other surgical history  02/2021    left rotator cuff repair    Repair rotator cuff,acute  2007    Skin surgery      basal cell ca removed 2015      Family History   Problem Relation Age of Onset    Cancer Father         prostate age 66     Hypertension Father     Diabetes Maternal Grandfather     Heart Disorder Paternal Grandmother     Cancer Paternal Grandfather         lung     Other (prostate cancer) Other         uncle      Social History     Socioeconomic History    Marital status:    Tobacco Use    Smoking status: Never    Smokeless tobacco: Never   Vaping Use    Vaping status: Never Used   Substance and Sexual Activity    Alcohol use: Yes    Drug use: No   Other Topics Concern     Service No    Blood  Transfusions No    Caffeine Concern Yes     Comment: diet pepsi    Occupational Exposure No    Hobby Hazards No    Sleep Concern No    Stress Concern No    Weight Concern No    Special Diet No    Back Care No    Exercise Yes    Bike Helmet Yes    Seat Belt Yes    Self-Exams No         REVIEW OF SYSTEMS:   GENERAL: feels well otherwise, no unplanned weight change,  good appetite  SKIN: no rashes or abnormal skin lesions  HEENT: See HPI.    LUNGS: denies shortness of breath or wheezing, See HPI  CARDIOVASCULAR: denies chest pain or palpitations   GI: denies N/V/C or abdominal pain  NEURO:  No numbness or tingling in face.    EXAM:   /78   Pulse 73   Temp 98.6 °F (37 °C) (Oral)   Resp 16   Wt 195 lb (88.5 kg)   SpO2 98%   BMI 26.45 kg/m²   GENERAL: well developed, well nourished,in no apparent distress  SKIN: no rashes,no suspicious lesions  HEAD: atraumatic, normocephalic,  no tenderness on palpation of sinuses  EYES: conjunctiva clear, EOM intact  EARS: TM's clear gray, no bulging, no retraction, + fluid, bony landmarks intact  NOSE: nostrils patent, clear nasal mucous, nasal mucosa reddened and swollen  THROAT: oral mucosa pink, moist. No visible dental caries. Posterior pharynx is not erythematous. no exudates.  NECK: supple, non-tender  LUNGS: clear to auscultation bilaterally, no wheezes or rhonchi, no diminished breath sounds. Breathing is non labored.  CARDIO: RRR without murmur  EXTREMITIES: no cyanosis, clubbing or edema  LYMPH:  bilateral anterior cervical lymphadenopathy.   NEURO: No focal deficits       ASSESSMENT AND PLAN:   ASSESSMENT:  Ed Ibrahim Daphney Barcenas is a 55 year old male who presents with    ASSESSMENT:   Encounter Diagnoses   Name Primary?    Sinus congestion Yes    Acute cough      Mucinex. Saline rinses. Flonase.   Printed script for augmentin to treat sinus infection if symptoms worsen or do not improving 3-5 days.   PLAN: Meds and instructions as below.  Comfort care  instructions as listed in Patient Instructions.  To f/u with PCP if no improvement in 3-5 days or sooner if sx worsen.    Meds & Refills for this Visit:  Requested Prescriptions     Signed Prescriptions Disp Refills    amoxicillin clavulanate 875-125 MG Oral Tab 14 tablet 0     Sig: Take 1 tablet by mouth 2 (two) times daily for 7 days.       Risks, benefits, side effects of medication addressed and explained.      The patient indicates understanding of these issues and agrees to the plan.

## 2025-03-20 ENCOUNTER — OFFICE VISIT (OUTPATIENT)
Dept: FAMILY MEDICINE CLINIC | Facility: CLINIC | Age: 55
End: 2025-03-20
Payer: COMMERCIAL

## 2025-03-20 VITALS
BODY MASS INDEX: 26.55 KG/M2 | OXYGEN SATURATION: 98 % | HEIGHT: 72 IN | DIASTOLIC BLOOD PRESSURE: 82 MMHG | HEART RATE: 62 BPM | RESPIRATION RATE: 16 BRPM | WEIGHT: 196 LBS | TEMPERATURE: 97 F | SYSTOLIC BLOOD PRESSURE: 110 MMHG

## 2025-03-20 DIAGNOSIS — J01.40 ACUTE NON-RECURRENT PANSINUSITIS: Primary | ICD-10-CM

## 2025-03-20 PROCEDURE — 99213 OFFICE O/P EST LOW 20 MIN: CPT | Performed by: NURSE PRACTITIONER

## 2025-03-20 RX ORDER — DOXYCYCLINE HYCLATE 100 MG
100 TABLET ORAL 2 TIMES DAILY
Qty: 14 TABLET | Refills: 0 | Status: SHIPPED | OUTPATIENT
Start: 2025-03-20 | End: 2025-03-27

## 2025-03-20 NOTE — PROGRESS NOTES
CHIEF COMPLAINT:     Chief Complaint   Patient presents with    Sinus Problem     C/o congestion, ST, cough  [facility] oV 2/28/25 x Sinus congestion, treated w Augmentin, sx subsided slightly but did not clear up all the way    OTC Mucinex DM        HPI:   Ed Shelley Jr. is a 55 year old male who presents for upper respiratory symptoms for over 1 months. Patient reports he had sinus symptoms and took Augmentin.  Symptoms improved slightly, Reports sore throat, congestion. Symptoms have been lingering since onset.  Treating symptoms with Mucinex DM.    Current Outpatient Medications   Medication Sig Dispense Refill    Doxycycline Hyclate 100 MG Oral Tab Take 1 tablet (100 mg total) by mouth 2 (two) times daily for 7 days. 14 tablet 0      Past Medical History:    B12 deficiency    Basal cell carcinoma    back removed 2015    Calculus of kidney    Dysplastic nevi    Multiple moles- Dr Barber    Pulmonary embolism (HCC)    1999    Thyroid nodule    Visual impairment    reading glasses      Past Surgical History:   Procedure Laterality Date    Colonoscopy  12/2021    tics; repeat 10 yrs    Other surgical history  2000    intermedulary nail-  rt tibia     Other surgical history  02/2021    left rotator cuff repair    Repair rotator cuff,acute  2007    Skin surgery      basal cell ca removed 2015         Social History     Socioeconomic History    Marital status:    Tobacco Use    Smoking status: Never    Smokeless tobacco: Never   Vaping Use    Vaping status: Never Used   Substance and Sexual Activity    Alcohol use: Yes    Drug use: No   Other Topics Concern     Service No    Blood Transfusions No    Caffeine Concern Yes     Comment: diet pepsi    Occupational Exposure No    Hobby Hazards No    Sleep Concern No    Stress Concern No    Weight Concern No    Special Diet No    Back Care No    Exercise Yes    Bike Helmet Yes    Seat Belt Yes    Self-Exams No         REVIEW OF SYSTEMS:   GENERAL: no  change in appetite  SKIN: no rashes or abnormal skin lesions  HEENT: See HPI  LUNGS: See HPI  CARDIOVASCULAR: denies chest pain or palpitations   GI: denies N/V/C or abdominal pain      EXAM:   /82   Pulse 62   Temp 97.2 °F (36.2 °C) (Temporal)   Resp 16   Ht 6' (1.829 m)   Wt 196 lb (88.9 kg)   SpO2 98%   BMI 26.58 kg/m²   GENERAL: well developed, well nourished,in no apparent distress  SKIN: no rashes,no suspicious lesions  HEAD: atraumatic, normocephalic.  minimal tenderness on palpation of sinuses  EYES: conjunctiva clear, EOM intact  EARS: TM's without erythema, no bulging, no retraction,no fluid, bony landmarks visible  NOSE: Nostrils patent, clear nasal discharge, nasal mucosa inflamed   THROAT: Oral mucosa pink, moist. Posterior pharynx is not erythematous. no exudates.    NECK: Supple, non-tender  LUNGS: clear to auscultation bilaterally, no wheezes or rhonchi. Breathing is non labored.  CARDIO: RRR without murmur  EXTREMITIES: no cyanosis, clubbing or edema  LYMPH:  no cervical lymphadenopathy.        ASSESSMENT AND PLAN:   Ed Shelley Jr. is a 55 year old male who presents with upper respiratory symptoms that are consistent with    ASSESSMENT:   Encounter Diagnosis   Name Primary?    Acute non-recurrent pansinusitis Yes       PLAN: Meds as below.  Start Flonase.  Comfort care as described in Patient Instructions    Meds & Refills for this Visit:  Requested Prescriptions     Signed Prescriptions Disp Refills    Doxycycline Hyclate 100 MG Oral Tab 14 tablet 0     Sig: Take 1 tablet (100 mg total) by mouth 2 (two) times daily for 7 days.     Risks, benefits, and side effects of medication explained and discussed.    The patient indicates understanding of these issues and agrees to the plan.  The patient is asked to f/u with PCP if sx's persist or worsen.  Patient Instructions   Flonase daily  Benadryl at night

## (undated) DEVICE — 5.0 MM I.D. UNIVERSAL CANNULA, 76                                    MM LONG, BLUE, LATEX SEAL,                                    SINGLE-USE STERILE PACKS, BOX OF 10.                                    INCLUDES OBTURATOR AND TROCAR

## (undated) DEVICE — SUPER TURBOVAC 90 IFS: Brand: COBLATION

## (undated) DEVICE — DERMABOND LIQUID ADHESIVE

## (undated) DEVICE — SHOULDER ARTHROSCOPY CDS-LF: Brand: MEDLINE INDUSTRIES, INC.

## (undated) DEVICE — SHEET,DRAPE,70X100,STERILE: Brand: MEDLINE

## (undated) DEVICE — SUTURE VICRYL 2-0 CT-2

## (undated) DEVICE — EXPRESSEW III SUTURE NEEDLE FOR USE WITH EXPRESSEW II OR III SUTURE PASSER: Brand: EXPRESSEW

## (undated) DEVICE — CANNULA 8.5MM TWIST AR-6530

## (undated) DEVICE — 3M™ STERI-DRAPE™ INSTRUMENT POUCH 1018: Brand: STERI-DRAPE™

## (undated) DEVICE — CONVERTORS STOCKINETTE: Brand: CONVERTORS

## (undated) DEVICE — KENDALL SCD EXPRESS SLEEVES, KNEE LENGTH, MEDIUM: Brand: KENDALL SCD

## (undated) DEVICE — SHIELD EYE UNIVERSAL

## (undated) DEVICE — STERILE POLYISOPRENE POWDER-FREE SURGICAL GLOVES: Brand: PROTEXIS

## (undated) DEVICE — SUTURE MONOCRYL 3-0 PS-2

## (undated) DEVICE — DRAPE,U/SHT,SPLIT,FILM,60X84,STERILE: Brand: MEDLINE

## (undated) DEVICE — ALCOHOL 70% 4 OZ

## (undated) DEVICE — Device

## (undated) DEVICE — #15 STERILE STAINLESS BLADE: Brand: STERILE STAINLESS BLADES

## (undated) DEVICE — STERILE SYNTHETIC POLYISOPRENE POWDER-FREE SURGICAL GLOVES WITH HYDROGEL COATING, SMOOTH FINISH, STRAIGHT FINGER: Brand: PROTEXIS

## (undated) DEVICE — 3M™ STERI-STRIP™ REINFORCED ADHESIVE SKIN CLOSURES, R1547, 1/2 IN X 4 IN (12 MM X 100 MM), 6 STRIPS/ENVELOPE: Brand: 3M™ STERI-STRIP™

## (undated) DEVICE — 3M™ IOBAN™ 2 ANTIMICROBIAL INCISE DRAPE 6648EZ: Brand: IOBAN™ 2

## (undated) DEVICE — LIGHT HANDLE

## (undated) DEVICE — PAD SACRAL SPAN AID

## (undated) DEVICE — WRAP COOLING SHLDR W/ICE PILLO

## (undated) DEVICE — GOWN,SIRUS,FABRIC-REINFORCED,X-LARGE: Brand: MEDLINE

## (undated) DEVICE — 3M(TM) TEGADERM(TM) TRANSPARENT FILM DRESSING FRAME STYLE 9505W: Brand: 3M™ TEGADERM™

## (undated) DEVICE — TUBING DW OUTFLOW

## (undated) DEVICE — TUBING IRR 16FT CNT WV 3 ASCP

## (undated) DEVICE — [RESECTOR CUTTER, ARTHROSCOPIC SHAVER BLADE,  DO NOT RESTERILIZE,  DO NOT USE IF PACKAGE IS DAMAGED,  KEEP DRY,  KEEP AWAY FROM SUNLIGHT]: Brand: FORMULA

## (undated) DEVICE — STERILE POLYISOPRENE POWDER-FREE SURGICAL GLOVES WITH EMOLLIENT COATING: Brand: PROTEXIS

## (undated) DEVICE — [TOMCAT CUTTER, ARTHROSCOPIC SHAVER BLADE,  DO NOT RESTERILIZE,  DO NOT USE IF PACKAGE IS DAMAGED,  KEEP DRY,  KEEP AWAY FROM SUNLIGHT]: Brand: FORMULA

## (undated) DEVICE — COVER,MAYO STAND,STERILE: Brand: MEDLINE

## (undated) DEVICE — SOL  .9 3000ML

## (undated) DEVICE — DUAL SPIKE ADAPTER: Brand: CONMED

## (undated) DEVICE — SUTURE TAPE

## (undated) DEVICE — KIT TRC TRIMANO BEACH CHR ARM

## (undated) DEVICE — GAUZE SPONGES,12 PLY: Brand: CURITY

## (undated) DEVICE — 3M™ TEGADERM™ TRANSPARENT FILM DRESSING, 1626W, 4 IN X 4-3/4 IN (10 CM X 12 CM), 50 EACH/CARTON, 4 CARTON/CASE: Brand: 3M™ TEGADERM™

## (undated) NOTE — LETTER
01/09/20        Lucina Russo  [address] [facility]      Dear Major Hernandez records indicate that you have outstanding lab work and or testing that was ordered for you and has not yet been completed:  Orders Placed This Encounter

## (undated) NOTE — ED AVS SNAPSHOT
Yun Hernandez   MRN: [id number]    Department:  [facility] Emergency Department in [place]   Date of Visit:  6/7/2018           Disclosure     Insurance plans vary and the physician(s) referred by the ER may not be covered by your plan.  Please contac tell this physician (or your personal doctor if your instructions are to return to your personal doctor) about any new or lasting problems. The primary care or specialist physician will see patients referred from the [facility] Emergency Department.  Shelton Lombard

## (undated) NOTE — LETTER
21  [facility]  Pre-Operative Clearance Request    Patient Name:   Lucina Russo             :   1970    Surgeon: Dr. Mila Parker             Date of Surgery: 21    Surgical Procedure: Left Shoulder Arth